# Patient Record
Sex: FEMALE | Race: OTHER | HISPANIC OR LATINO | URBAN - METROPOLITAN AREA
[De-identification: names, ages, dates, MRNs, and addresses within clinical notes are randomized per-mention and may not be internally consistent; named-entity substitution may affect disease eponyms.]

---

## 2022-06-14 ENCOUNTER — TELEPHONE (OUTPATIENT)
Dept: PLASTIC SURGERY | Facility: CLINIC | Age: 22
End: 2022-06-14

## 2022-06-14 NOTE — TELEPHONE ENCOUNTER
Returned call to patient regarding appointment for breast reduction  Patient does not go to [facility] and has no documentation  Will start process and let me know when documentation is complete and being faxed so I can call her to make appt  Also emailed letter with criteria

## 2024-01-04 PROCEDURE — 99283 EMERGENCY DEPT VISIT LOW MDM: CPT

## 2024-01-05 ENCOUNTER — HOSPITAL ENCOUNTER (EMERGENCY)
Facility: HOSPITAL | Age: 24
Discharge: HOME | End: 2024-01-05
Attending: EMERGENCY MEDICINE
Payer: COMMERCIAL

## 2024-01-05 VITALS
BODY MASS INDEX: 23.03 KG/M2 | SYSTOLIC BLOOD PRESSURE: 112 MMHG | WEIGHT: 122 LBS | TEMPERATURE: 97.8 F | HEART RATE: 59 BPM | RESPIRATION RATE: 18 BRPM | OXYGEN SATURATION: 100 % | DIASTOLIC BLOOD PRESSURE: 60 MMHG | HEIGHT: 61 IN

## 2024-01-05 DIAGNOSIS — R68.89 FLU-LIKE SYMPTOMS: Primary | ICD-10-CM

## 2024-01-05 LAB
FLUAV RNA RESP QL NAA+PROBE: NEGATIVE
FLUBV RNA RESP QL NAA+PROBE: NEGATIVE
RSV RNA RESP QL NAA+PROBE: NEGATIVE
SARS-COV-2 RNA RESP QL NAA+PROBE: NEGATIVE

## 2024-01-05 PROCEDURE — 99284 EMERGENCY DEPT VISIT MOD MDM: CPT | Performed by: EMERGENCY MEDICINE

## 2024-01-05 PROCEDURE — 0241U HB NFCT DS VIR RESP RNA 4 TRGT: CPT | Performed by: EMERGENCY MEDICINE

## 2024-01-05 RX ORDER — ONDANSETRON 4 MG/1
4 TABLET, ORALLY DISINTEGRATING ORAL EVERY 8 HOURS PRN
Qty: 20 TABLET | Refills: 0 | Status: SHIPPED | OUTPATIENT
Start: 2024-01-05

## 2024-01-05 NOTE — Clinical Note
Delmi Tejeda was seen and treated in our emergency department on 1/4/2024.                Diagnosis:     Delmi  .    She may return on this date: 01/08/2024         If you have any questions or concerns, please don't hesitate to call.      Brianna Glover MD    ______________________________           _______________          _______________  Hospital Representative                              Date                                Time

## 2024-01-05 NOTE — Clinical Note
Delmi Tejeda was seen and treated in our emergency department on 1/4/2024.                Diagnosis:     Delmi  may return to work on return date.    She may return on this date: 01/05/2024         If you have any questions or concerns, please don't hesitate to call.      Brianna Glover MD    ______________________________           _______________          _______________  Hospital Representative                              Date                                Time

## 2024-01-05 NOTE — DISCHARGE INSTRUCTIONS
Plenty of fluids  Tylenol 650mg every 6 hours as needed for pain, fever (max 3000mg in 24 hours)   Aleve 2 tabs twice daily with food OR ibuprofen 200-800mg every 8 hours with food as needed for pain (if not pregnant)  Zofran as needed for nausea  Return with lightheadedness rash trouble breathing not peeing every 6-8 hours or any new or worsening symptoms

## 2024-01-05 NOTE — ED PROVIDER NOTES
History  Chief Complaint   Patient presents with    Generalized Body Aches     Pt presents with body aches, chills, headache x 1 day     23-year-old otherwise healthy female presents with 1 day history of headache chills and bodyaches.  Her mom who was in the same household is also been sick with flulike symptoms.  She has had slight nausea no vomiting she denies any chest pain shortness of breath or wheezing.  No history of any rashes.  She denies abdominal pain or diarrhea.  No dysuria or increased urinary frequency no lightheadedness.  She was sent home from work today due to symptoms.  Past medical history is unremarkable past surgical history is breast reduction surgery injury and abdominoplasty        None       History reviewed. No pertinent past medical history.    History reviewed. No pertinent surgical history.    History reviewed. No pertinent family history.  I have reviewed and agree with the history as documented.    E-Cigarette/Vaping    E-Cigarette Use Current Some Day User      E-Cigarette/Vaping Substances    Nicotine Yes      Social History     Tobacco Use    Smoking status: Never    Smokeless tobacco: Never   Vaping Use    Vaping status: Some Days    Substances: Nicotine   Substance Use Topics    Alcohol use: Never    Drug use: Never       Review of Systems   Constitutional:  Positive for activity change and appetite change. Negative for chills and fever.   HENT:  Negative for congestion, ear pain, rhinorrhea, sneezing, sore throat, trouble swallowing and voice change.    Eyes:  Negative for discharge.   Respiratory:  Negative for cough and shortness of breath.    Cardiovascular:  Negative for chest pain and leg swelling.   Gastrointestinal:  Positive for nausea. Negative for abdominal pain, blood in stool, diarrhea and vomiting.   Endocrine: Negative for polyuria.   Genitourinary:  Negative for difficulty urinating, dysuria, frequency and urgency.   Musculoskeletal:  Positive for myalgias.  Negative for back pain.   Skin:  Negative for rash.   Neurological:  Positive for headaches. Negative for dizziness, weakness and numbness.   Hematological:  Negative for adenopathy.   Psychiatric/Behavioral:  Negative for confusion.    All other systems reviewed and are negative.      Physical Exam  Physical Exam  Vitals and nursing note reviewed.   Constitutional:       General: She is not in acute distress.     Appearance: She is well-developed. She is not diaphoretic.   HENT:      Head: Normocephalic and atraumatic.      Right Ear: Tympanic membrane and external ear normal.      Left Ear: Tympanic membrane and external ear normal.      Nose: Nose normal.      Mouth/Throat:      Mouth: Mucous membranes are dry.      Pharynx: No oropharyngeal exudate or posterior oropharyngeal erythema.   Eyes:      General:         Right eye: No discharge.         Left eye: No discharge.      Extraocular Movements: Extraocular movements intact.      Conjunctiva/sclera: Conjunctivae normal.      Pupils: Pupils are equal, round, and reactive to light.   Neck:      Comments: No midline or paraspinous tenderness  Cardiovascular:      Rate and Rhythm: Normal rate and regular rhythm.      Pulses: Normal pulses.      Heart sounds: Normal heart sounds.   Pulmonary:      Effort: Pulmonary effort is normal. No respiratory distress.      Breath sounds: Normal breath sounds. No stridor. No wheezing, rhonchi or rales.      Comments: Sats are 100% on room air  Abdominal:      General: Bowel sounds are normal. There is no distension.      Palpations: Abdomen is soft.      Tenderness: There is no abdominal tenderness. There is no right CVA tenderness, left CVA tenderness, guarding or rebound.      Comments: Back no midline or CVA tenderness   Musculoskeletal:         General: No tenderness or deformity. Normal range of motion.      Cervical back: Normal range of motion and neck supple. No rigidity or tenderness.      Right lower leg: No edema.       Left lower leg: No edema.   Lymphadenopathy:      Cervical: No cervical adenopathy.   Skin:     General: Skin is warm and dry.      Capillary Refill: Capillary refill takes less than 2 seconds.   Neurological:      Mental Status: She is alert and oriented to person, place, and time. Mental status is at baseline.      Cranial Nerves: No cranial nerve deficit.      Sensory: No sensory deficit.      Motor: No weakness or abnormal muscle tone.      Coordination: Coordination normal.      Gait: Gait normal.      Comments: Gait steady   Psychiatric:         Mood and Affect: Mood normal.         Vital Signs  ED Triage Vitals [01/05/24 0036]   Temperature Pulse Respirations Blood Pressure SpO2   97.8 °F (36.6 °C) 59 18 112/60 100 %      Temp Source Heart Rate Source Patient Position - Orthostatic VS BP Location FiO2 (%)   Temporal Monitor Sitting Left arm --      Pain Score       10 - Worst Possible Pain           Vitals:    01/05/24 0036   BP: 112/60   Pulse: 59   Patient Position - Orthostatic VS: Sitting         Visual Acuity      ED Medications  Medications - No data to display    Diagnostic Studies  Results Reviewed       Procedure Component Value Units Date/Time    FLU/RSV/COVID - if FLU/RSV clinically relevant [[id number]]  (Normal) Collected: 01/05/24 0042    Lab Status: Final result Specimen: Nares from Nose Updated: 01/05/24 0126     SARS-CoV-2 Negative     INFLUENZA A PCR Negative     INFLUENZA B PCR Negative     RSV PCR Negative    Narrative:      FOR PEDIATRIC PATIENTS - copy/paste COVID Guidelines URL to browser: [email or web address]    SARS-CoV-2 assay is a Nucleic Acid Amplification assay intended for the  qualitative detection of nucleic acid from SARS-CoV-2 in nasopharyngeal  swabs. Results are for the presumptive identification of SARS-CoV-2 RNA.    Positive results are indicative of infection with SARS-CoV-2, the virus  causing COVID-19, but do  not rule out bacterial infection or co-infection  with other viruses. Laboratories within the [place] and its  territories are required to report all positive results to the appropriate  public health authorities. Negative results do not preclude SARS-CoV-2  infection and should not be used as the sole basis for treatment or other  patient management decisions. Negative results must be combined with  clinical observations, patient history, and epidemiological information.  This test has not been FDA cleared or approved.    This test has been authorized by FDA under an Emergency Use Authorization  (EUA). This test is only authorized for the duration of time the  declaration that circumstances exist justifying the authorization of the  emergency use of an in vitro diagnostic tests for detection of SARS-CoV-2  virus and/or diagnosis of COVID-19 infection under section 564(b)(1) of  the Act, 21 U.S.C. 360bbb-3(b)(1), unless the authorization is terminated  or revoked sooner. The test has been validated but independent review by FDA  and CLIA is pending.    Test performed using [employer] GeneXpert: This RT-PCR assay targets N2,  a region unique to SARS-CoV-2. A conserved region in the E-gene was chosen  for pan-Sarbecovirus detection which includes SARS-CoV-2.    According to CMS-2020-01-R, this platform meets the definition of high-throughput technology.                   No orders to display              Procedures  Procedures         ED Course                               SBIRT 22yo+      Flowsheet Row Most Recent Value   Initial Alcohol Screen: US AUDIT-C     1. How often do you have a drink containing alcohol? 0 Filed at: 01/05/2024 0039   2. How many drinks containing alcohol do you have on a typical day you are drinking?  0 Filed at: 01/05/2024 0039   3b. FEMALE Any Age, or MALE 65+: How often do you have 4 or more drinks on one occassion? 0 Filed at: 01/05/2024 0039   Audit-C Score 0 Filed at: 01/05/2024  0039   NOAM: How many times in the past year have you...    Used an illegal drug or used a prescription medication for non-medical reasons? Never Filed at: 01/05/2024 0039                      Medical Decision Making  Patient is not demonstrating any signs or symptoms of increased work of breathing or respiratory distress.  Therefore will defer chest x-ray patient is in agreement.  Ultimately her COVID influenza RSV swab was negative this was reviewed with patient there is the possibility of an early false negative.  Recommend symptomatic management ibuprofen Tylenol appropriate dosages reviewed she can only take the NSAIDs if she is not pregnant will prescribe Zofran to use on an as-needed basis to stay hydrated return precautions were reviewed.    Risk  Prescription drug management.             Disposition  Final diagnoses:   Flu-like symptoms     Time reflects when diagnosis was documented in both MDM as applicable and the Disposition within this note       Time User Action Codes Description Comment    1/5/2024  1:27 AM Brianna Glover [R68.89] Flu-like symptoms           ED Disposition       ED Disposition   Discharge    Condition   Stable    Date/Time   Fri Jan 5, 2024 0127    Comment   Delmi Tejeda discharge to home/self care.                   Follow-up Information       Follow up With Specialties Details Why Contact Info    Yesica Murray  Call in 3 days if not improved [address]  [address] [facility] [address]  [phone]              Discharge Medication List as of 1/5/2024  1:33 AM        START taking these medications    Details   ondansetron (ZOFRAN-ODT) 4 mg disintegrating tablet Take 1 tablet (4 mg total) by mouth every 8 (eight) hours as needed for nausea or vomiting for up to 20 doses, Starting Fri 1/5/2024, Normal             No discharge procedures on file.    PDMP Review       None            ED Provider  Electronically Signed by             Brianna Glover,  MD  01/05/24 0153

## 2024-02-19 ENCOUNTER — HOSPITAL ENCOUNTER (EMERGENCY)
Facility: HOSPITAL | Age: 24
Discharge: HOME | End: 2024-02-19
Attending: EMERGENCY MEDICINE
Payer: COMMERCIAL

## 2024-02-19 VITALS
WEIGHT: 122 LBS | RESPIRATION RATE: 16 BRPM | OXYGEN SATURATION: 99 % | TEMPERATURE: 96.9 F | DIASTOLIC BLOOD PRESSURE: 65 MMHG | BODY MASS INDEX: 23.03 KG/M2 | HEART RATE: 83 BPM | HEIGHT: 61 IN | SYSTOLIC BLOOD PRESSURE: 112 MMHG

## 2024-02-19 DIAGNOSIS — N30.00 ACUTE CYSTITIS: Primary | ICD-10-CM

## 2024-02-19 LAB
BACTERIA UR QL AUTO: NORMAL /HPF
BILIRUB UR QL STRIP: NEGATIVE
CLARITY UR: ABNORMAL
COLOR UR: YELLOW
EXT PREGNANCY TEST URINE: NEGATIVE
EXT. CONTROL: NORMAL
GLUCOSE UR STRIP-MCNC: NEGATIVE MG/DL
HGB UR QL STRIP.AUTO: ABNORMAL
KETONES UR STRIP-MCNC: ABNORMAL MG/DL
LEUKOCYTE ESTERASE UR QL STRIP: NEGATIVE
NITRITE UR QL STRIP: NEGATIVE
NON-SQ EPI CELLS URNS QL MICRO: NORMAL /HPF
PH UR STRIP.AUTO: 8 [PH]
PROT UR STRIP-MCNC: NEGATIVE MG/DL
RBC #/AREA URNS AUTO: NORMAL /HPF
SP GR UR STRIP.AUTO: 1.01 (ref 1–1.03)
UROBILINOGEN UR QL STRIP.AUTO: 1 E.U./DL
WBC #/AREA URNS AUTO: NORMAL /HPF

## 2024-02-19 PROCEDURE — 87491 CHLMYD TRACH DNA AMP PROBE: CPT | Performed by: EMERGENCY MEDICINE

## 2024-02-19 PROCEDURE — 87591 N.GONORRHOEAE DNA AMP PROB: CPT | Performed by: EMERGENCY MEDICINE

## 2024-02-19 PROCEDURE — 81514 NFCT DS BV&VAGINITIS DNA ALG: CPT | Performed by: EMERGENCY MEDICINE

## 2024-02-19 PROCEDURE — 87086 URINE CULTURE/COLONY COUNT: CPT | Performed by: EMERGENCY MEDICINE

## 2024-02-19 PROCEDURE — 99283 EMERGENCY DEPT VISIT LOW MDM: CPT

## 2024-02-19 PROCEDURE — 81025 URINE PREGNANCY TEST: CPT | Performed by: EMERGENCY MEDICINE

## 2024-02-19 PROCEDURE — 81001 URINALYSIS AUTO W/SCOPE: CPT | Performed by: EMERGENCY MEDICINE

## 2024-02-19 PROCEDURE — 99284 EMERGENCY DEPT VISIT MOD MDM: CPT | Performed by: EMERGENCY MEDICINE

## 2024-02-19 RX ORDER — PHENAZOPYRIDINE HYDROCHLORIDE 200 MG/1
200 TABLET, FILM COATED ORAL 3 TIMES DAILY
Qty: 6 TABLET | Refills: 0 | Status: SHIPPED | OUTPATIENT
Start: 2024-02-19

## 2024-02-19 RX ORDER — IBUPROFEN 600 MG/1
600 TABLET ORAL ONCE
Status: COMPLETED | OUTPATIENT
Start: 2024-02-19 | End: 2024-02-19

## 2024-02-19 RX ORDER — IBUPROFEN 600 MG/1
600 TABLET ORAL EVERY 6 HOURS PRN
Qty: 30 TABLET | Refills: 0 | Status: SHIPPED | OUTPATIENT
Start: 2024-02-19

## 2024-02-19 RX ORDER — PHENAZOPYRIDINE HYDROCHLORIDE 100 MG/1
100 TABLET, FILM COATED ORAL ONCE
Status: COMPLETED | OUTPATIENT
Start: 2024-02-19 | End: 2024-02-19

## 2024-02-19 RX ORDER — CEPHALEXIN 250 MG/1
500 CAPSULE ORAL ONCE
Status: COMPLETED | OUTPATIENT
Start: 2024-02-19 | End: 2024-02-19

## 2024-02-19 RX ORDER — CEPHALEXIN 500 MG/1
500 CAPSULE ORAL EVERY 12 HOURS SCHEDULED
Qty: 14 CAPSULE | Refills: 0 | Status: SHIPPED | OUTPATIENT
Start: 2024-02-19 | End: 2024-02-26

## 2024-02-19 RX ADMIN — CEPHALEXIN 500 MG: 250 CAPSULE ORAL at 04:53

## 2024-02-19 RX ADMIN — IBUPROFEN 600 MG: 600 TABLET, FILM COATED ORAL at 04:52

## 2024-02-19 RX ADMIN — PHENAZOPYRIDINE 100 MG: 100 TABLET ORAL at 04:53

## 2024-02-19 NOTE — ED PROVIDER NOTES
History  Chief Complaint   Patient presents with    Possible UTI     Patient reports burning with urination for the past week.      Delmi Tejeda is a 23 y.o. year old female previously healthy presenting to the [facility] ED for dysuria. Patient is reporting 1 week of worsening symptoms.  She has burning with urination.  She has the sudden urge to urinate.  She has had no hematuria though she is currently on her period.  Patient is reporting mild, crampy lower abdominal pain which she attributes to her period.  She had no fevers or chills.  No nausea/vomiting.  No flank pain.  Patient is in a monogamous relationship.  She noticed thick white discharge prior to onset of period which was not malodorous. Patient denies any other complaints. Patient has not taken/received any medications at home for relief of symptoms.      History provided by:  Medical records and patient   used: No        Prior to Admission Medications   Prescriptions Last Dose Informant Patient Reported? Taking?   ondansetron (ZOFRAN-ODT) 4 mg disintegrating tablet   No No   Sig: Take 1 tablet (4 mg total) by mouth every 8 (eight) hours as needed for nausea or vomiting for up to 20 doses      Facility-Administered Medications: None       History reviewed. No pertinent past medical history.    History reviewed. No pertinent surgical history.    History reviewed. No pertinent family history.  I have reviewed and agree with the history as documented.    E-Cigarette/Vaping    E-Cigarette Use Current Some Day User      E-Cigarette/Vaping Substances    Nicotine Yes      Social History     Tobacco Use    Smoking status: Never    Smokeless tobacco: Never   Vaping Use    Vaping status: Some Days    Substances: Nicotine   Substance Use Topics    Alcohol use: Never    Drug use: Never       Review of Systems   Constitutional:  Negative for chills and fever.   Respiratory:  Negative for shortness of breath.    Cardiovascular:  Negative for chest  pain.   Gastrointestinal:  Positive for abdominal pain. Negative for diarrhea, nausea and vomiting.   Genitourinary:  Positive for dysuria, urgency and vaginal bleeding. Negative for flank pain and hematuria.   Musculoskeletal:  Negative for back pain.   All other systems reviewed and are negative.      Physical Exam  Physical Exam  Vitals and nursing note reviewed.   Constitutional:       General: She is not in acute distress.     Appearance: Normal appearance. She is well-developed. She is not ill-appearing, toxic-appearing or diaphoretic.   HENT:      Head: Normocephalic and atraumatic.      Nose: No congestion or rhinorrhea.   Eyes:      General:         Right eye: No discharge.         Left eye: No discharge.   Cardiovascular:      Rate and Rhythm: Normal rate and regular rhythm.   Pulmonary:      Effort: Pulmonary effort is normal. No accessory muscle usage or respiratory distress.      Breath sounds: Normal breath sounds. No stridor. No decreased breath sounds, wheezing, rhonchi or rales.   Abdominal:      General: There is no distension.      Palpations: Abdomen is soft.      Tenderness: There is abdominal tenderness in the right lower quadrant, suprapubic area and left lower quadrant. There is no right CVA tenderness, left CVA tenderness, guarding or rebound.   Musculoskeletal:      Cervical back: Normal range of motion. No rigidity.      Right lower leg: No tenderness.      Left lower leg: No tenderness.   Skin:     Capillary Refill: Capillary refill takes less than 2 seconds.      Findings: No rash.   Neurological:      Mental Status: She is alert and oriented to person, place, and time.   Psychiatric:         Mood and Affect: Mood normal.         Behavior: Behavior normal.         Vital Signs  ED Triage Vitals [02/19/24 0407]   Temperature Pulse Respirations Blood Pressure SpO2   (!) 96.9 °F (36.1 °C) 83 16 112/65 99 %      Temp Source Heart Rate Source Patient Position - Orthostatic VS BP Location FiO2  (%)   Temporal Monitor Sitting Left arm --      Pain Score       2           Vitals:    02/19/24 0407   BP: 112/65   Pulse: 83   Patient Position - Orthostatic VS: Sitting         Visual Acuity      ED Medications  Medications   ibuprofen (MOTRIN) tablet 600 mg (600 mg Oral Given 2/19/24 0452)   cephalexin (KEFLEX) capsule 500 mg (500 mg Oral Given 2/19/24 0453)   phenazopyridine (PYRIDIUM) tablet 100 mg (100 mg Oral Given 2/19/24 0453)       Diagnostic Studies  Results Reviewed       Procedure Component Value Units Date/Time    Molecular Vaginal Panel [[id number]] Collected: 02/19/24 0455    Lab Status: In process Specimen: Genital from Vaginal Updated: 02/19/24 0503    Chlamydia/GC amplified DNA by PCR [[id number]] Collected: 02/19/24 0413    Lab Status: In process Updated: 02/19/24 0453    Urine culture [[id number]] Collected: 02/19/24 0413    Lab Status: In process Specimen: Urine, Clean Catch Updated: 02/19/24 0453    Urine Microscopic [[id number]]  (Normal) Collected: 02/19/24 0413    Lab Status: Final result Specimen: Urine, Clean Catch Updated: 02/19/24 0428     RBC, UA 1-2 /hpf      WBC, UA 1-2 /hpf      Epithelial Cells Occasional /hpf      Bacteria, UA None Seen /hpf     UA w Reflex to Microscopic w Reflex to Culture [[id number]]  (Abnormal) Collected: 02/19/24 0413    Lab Status: Final result Specimen: Urine, Clean Catch Updated: 02/19/24 0421     Color, UA Yellow     Clarity, UA Slightly Cloudy     Specific Gravity, UA 1.015     pH, UA 8.0     Leukocytes, UA Negative     Nitrite, UA Negative     Protein, UA Negative mg/dl      Glucose, UA Negative mg/dl      Ketones, UA Trace mg/dl      Urobilinogen, UA 1.0 E.U./dl      Bilirubin, UA Negative     Occult Blood, UA Large    POCT pregnancy, urine [[id number]]  (Normal) Resulted: 02/19/24 0418    Lab Status: Final result Updated: 02/19/24 0418     EXT Preg Test, Ur Negative     Control Valid                   No orders to display               Procedures  Procedures         ED Course                               SBIRT 22yo+      Flowsheet Row Most Recent Value   Initial Alcohol Screen: US AUDIT-C     1. How often do you have a drink containing alcohol? 0 Filed at: 02/19/2024 0406   2. How many drinks containing alcohol do you have on a typical day you are drinking?  0 Filed at: 02/19/2024 0406   3a. Male UNDER 65: How often do you have five or more drinks on one occasion? 0 Filed at: 02/19/2024 0406   3b. FEMALE Any Age, or MALE 65+: How often do you have 4 or more drinks on one occassion? 0 Filed at: 02/19/2024 0406   Audit-C Score 0 Filed at: 02/19/2024 0406   NOAM: How many times in the past year have you...    Used an illegal drug or used a prescription medication for non-medical reasons? Never Filed at: 02/19/2024 0406                      Medical Decision Making    23 y.o. female presenting for burning with urination.  VSS, well-appearing.  Will order Upreg and UA to evaluate for UTI.  No fever, vomiting or flank pain to suggest appendicitis or pyelonephritis.  Will treat symptomatically.    UA does not appear consistent with cystitis.  Given symptoms will treat with course of cephalexin while awaiting urine culture results.  Given reported thick vaginal discharge will order molecular vaginal panel to screen for BV.  Will also screen for GC.    I have discussed with the patient our plan to discharge them from the ED and the patient is in agreement with this plan. The patient was provided a written after visit summary with strict RTED precautions.     Discharge Plan: Prescription for cephalexin, Pyridium, Motrin.  Patient aware she is being discharged with pending molecular vaginal panel and G/C.    Followup: I have discussed with the patient plan to follow up with a PCP. Contact information provided in AVS.    Amount and/or Complexity of Data Reviewed  Labs: ordered.    Risk  Prescription drug management.             Disposition  Final  diagnoses:   Acute cystitis     Time reflects when diagnosis was documented in both MDM as applicable and the Disposition within this note       Time User Action Codes Description Comment    2/19/2024  4:36 AM Eloy Horvath [N30.00] Acute cystitis           ED Disposition       ED Disposition   Discharge    Condition   Stable    Date/Time   Mon Feb 19, 2024 0438    Comment   Delmi Tejeda discharge to home/self care.                   Follow-up Information       Follow up With Specialties Details Why Contact Info Additional Information    [facility] Schedule an appointment as soon as possible for a visit  To make appointment for reevaluation in 3-5 days and to establish care. [address] [place] [id number]  [phone] [facility], [address] [place] [address], [id number], [phone]            Discharge Medication List as of 2/19/2024  4:43 AM        START taking these medications    Details   cephalexin (KEFLEX) 500 mg capsule Take 1 capsule (500 mg total) by mouth every 12 (twelve) hours for 7 days, Starting Mon 2/19/2024, Until Mon 2/26/2024, Normal      ibuprofen (MOTRIN) 600 mg tablet Take 1 tablet (600 mg total) by mouth every 6 (six) hours as needed for moderate pain, Starting Mon 2/19/2024, Normal      phenazopyridine (PYRIDIUM) 200 mg tablet Take 1 tablet (200 mg total) by mouth 3 (three) times a day, Starting Mon 2/19/2024, Normal           CONTINUE these medications which have NOT CHANGED    Details   ondansetron (ZOFRAN-ODT) 4 mg disintegrating tablet Take 1 tablet (4 mg total) by mouth every 8 (eight) hours as needed for nausea or vomiting for up to 20 doses, Starting Fri 1/5/2024, Normal             No discharge procedures on file.    PDMP Review       None            ED Provider  Electronically Signed by             Eloy Horvath DO  02/19/24 0606

## 2024-02-19 NOTE — DISCHARGE INSTRUCTIONS
You have been seen for discomfort with urination. Please complete the course of cephalexin as prescribed. Take motrin and pyridium for your symptoms. Return to the emergency department if you develop worsening discomfort, abdominal pain, vomiting, fevers or any other symptoms of concern. Please follow up with a PCP by calling the number provided.

## 2024-02-19 NOTE — Clinical Note
Delmi Tejeda was seen and treated in our emergency department on 2/19/2024.                Diagnosis: dysuria    Delmi  is off the rest of the shift today.    She may return on this date:          If you have any questions or concerns, please don't hesitate to call.      Moira S Page, RN    ______________________________           _______________          _______________  Hospital Representative                              Date                                Time

## 2024-02-20 LAB
BACTERIA UR CULT: NORMAL
C GLABRATA DNA VAG QL NAA+PROBE: NEGATIVE
C KRUSEI DNA VAG QL NAA+PROBE: NEGATIVE
C TRACH DNA SPEC QL NAA+PROBE: POSITIVE
CANDIDA SP 6 PNL VAG NAA+PROBE: NEGATIVE
N GONORRHOEA DNA SPEC QL NAA+PROBE: NEGATIVE
T VAGINALIS DNA VAG QL NAA+PROBE: NEGATIVE
VAGINOSIS/ITIS DNA PNL VAG PROBE+SIG AMP: NEGATIVE

## 2024-02-21 DIAGNOSIS — A74.9 CHLAMYDIA INFECTION: Primary | ICD-10-CM

## 2024-02-21 RX ORDER — DOXYCYCLINE HYCLATE 100 MG/1
100 CAPSULE ORAL 2 TIMES DAILY
Qty: 14 CAPSULE | Refills: 0 | Status: SHIPPED | OUTPATIENT
Start: 2024-02-21 | End: 2024-02-28

## 2024-03-21 ENCOUNTER — PATIENT MESSAGE (OUTPATIENT)
Age: 24
End: 2024-03-21

## 2024-03-21 ENCOUNTER — HOSPITAL ENCOUNTER (EMERGENCY)
Facility: HOSPITAL | Age: 24
Discharge: HOME | End: 2024-03-21
Attending: EMERGENCY MEDICINE
Payer: COMMERCIAL

## 2024-03-21 ENCOUNTER — APPOINTMENT (EMERGENCY)
Dept: ULTRASOUND IMAGING | Facility: HOSPITAL | Age: 24
End: 2024-03-21
Payer: COMMERCIAL

## 2024-03-21 ENCOUNTER — TELEPHONE (OUTPATIENT)
Age: 24
End: 2024-03-21

## 2024-03-21 VITALS
RESPIRATION RATE: 18 BRPM | DIASTOLIC BLOOD PRESSURE: 65 MMHG | BODY MASS INDEX: 23.03 KG/M2 | WEIGHT: 122 LBS | OXYGEN SATURATION: 100 % | TEMPERATURE: 97.2 F | HEIGHT: 61 IN | HEART RATE: 67 BPM | SYSTOLIC BLOOD PRESSURE: 113 MMHG

## 2024-03-21 DIAGNOSIS — R93.5 ABNORMAL ULTRASOUND OF UTERUS: ICD-10-CM

## 2024-03-21 DIAGNOSIS — Z32.01 POSITIVE PREGNANCY TEST: ICD-10-CM

## 2024-03-21 DIAGNOSIS — R10.9 ABDOMINAL CRAMPING: Primary | ICD-10-CM

## 2024-03-21 LAB
ABO GROUP BLD: NORMAL
ALBUMIN SERPL BCP-MCNC: 4.6 G/DL (ref 3.5–5)
ALP SERPL-CCNC: 50 U/L (ref 34–104)
ALT SERPL W P-5'-P-CCNC: 14 U/L (ref 7–52)
ANION GAP SERPL CALCULATED.3IONS-SCNC: 8 MMOL/L (ref 4–13)
AST SERPL W P-5'-P-CCNC: 17 U/L (ref 13–39)
B-HCG SERPL-ACNC: ABNORMAL MIU/ML (ref 0–5)
BASOPHILS # BLD AUTO: 0.07 THOUSANDS/ÂΜL (ref 0–0.1)
BASOPHILS NFR BLD AUTO: 1 % (ref 0–1)
BILIRUB SERPL-MCNC: 0.43 MG/DL (ref 0.2–1)
BILIRUB UR QL STRIP: NEGATIVE
BUN SERPL-MCNC: 8 MG/DL (ref 5–25)
CALCIUM SERPL-MCNC: 10 MG/DL (ref 8.4–10.2)
CHLORIDE SERPL-SCNC: 103 MMOL/L (ref 96–108)
CLARITY UR: CLEAR
CO2 SERPL-SCNC: 25 MMOL/L (ref 21–32)
COLOR UR: YELLOW
CREAT SERPL-MCNC: 0.71 MG/DL (ref 0.6–1.3)
EOSINOPHIL # BLD AUTO: 0.2 THOUSAND/ÂΜL (ref 0–0.61)
EOSINOPHIL NFR BLD AUTO: 4 % (ref 0–6)
ERYTHROCYTE [DISTWIDTH] IN BLOOD BY AUTOMATED COUNT: 12.8 % (ref 11.6–15.1)
EXT PREGNANCY TEST URINE: POSITIVE
EXT. CONTROL: ABNORMAL
GFR SERPL CREATININE-BSD FRML MDRD: 118 ML/MIN/1.73SQ M
GLUCOSE SERPL-MCNC: 69 MG/DL (ref 65–140)
GLUCOSE UR STRIP-MCNC: NEGATIVE MG/DL
HCT VFR BLD AUTO: 41 % (ref 34.8–46.1)
HGB BLD-MCNC: 13.2 G/DL (ref 11.5–15.4)
HGB UR QL STRIP.AUTO: NEGATIVE
IMM GRANULOCYTES # BLD AUTO: 0.01 THOUSAND/UL (ref 0–0.2)
IMM GRANULOCYTES NFR BLD AUTO: 0 % (ref 0–2)
KETONES UR STRIP-MCNC: NEGATIVE MG/DL
LEUKOCYTE ESTERASE UR QL STRIP: NEGATIVE
LIPASE SERPL-CCNC: 40 U/L (ref 11–82)
LYMPHOCYTES # BLD AUTO: 1.42 THOUSANDS/ÂΜL (ref 0.6–4.47)
LYMPHOCYTES NFR BLD AUTO: 28 % (ref 14–44)
MCH RBC QN AUTO: 28.9 PG (ref 26.8–34.3)
MCHC RBC AUTO-ENTMCNC: 32.2 G/DL (ref 31.4–37.4)
MCV RBC AUTO: 90 FL (ref 82–98)
MONOCYTES # BLD AUTO: 0.34 THOUSAND/ÂΜL (ref 0.17–1.22)
MONOCYTES NFR BLD AUTO: 7 % (ref 4–12)
NEUTROPHILS # BLD AUTO: 3 THOUSANDS/ÂΜL (ref 1.85–7.62)
NEUTS SEG NFR BLD AUTO: 60 % (ref 43–75)
NITRITE UR QL STRIP: NEGATIVE
NRBC BLD AUTO-RTO: 0 /100 WBCS
PH UR STRIP.AUTO: 6.5 [PH]
PLATELET # BLD AUTO: 263 THOUSANDS/UL (ref 149–390)
PMV BLD AUTO: 11.4 FL (ref 8.9–12.7)
POTASSIUM SERPL-SCNC: 3.2 MMOL/L (ref 3.5–5.3)
PROT SERPL-MCNC: 7.6 G/DL (ref 6.4–8.4)
PROT UR STRIP-MCNC: NEGATIVE MG/DL
RBC # BLD AUTO: 4.56 MILLION/UL (ref 3.81–5.12)
RH BLD: POSITIVE
SODIUM SERPL-SCNC: 136 MMOL/L (ref 135–147)
SP GR UR STRIP.AUTO: 1.01 (ref 1–1.03)
UROBILINOGEN UR QL STRIP.AUTO: 0.2 E.U./DL
WBC # BLD AUTO: 5.04 THOUSAND/UL (ref 4.31–10.16)

## 2024-03-21 PROCEDURE — 76815 OB US LIMITED FETUS(S): CPT

## 2024-03-21 PROCEDURE — 96375 TX/PRO/DX INJ NEW DRUG ADDON: CPT

## 2024-03-21 PROCEDURE — 81003 URINALYSIS AUTO W/O SCOPE: CPT | Performed by: EMERGENCY MEDICINE

## 2024-03-21 PROCEDURE — 81025 URINE PREGNANCY TEST: CPT | Performed by: EMERGENCY MEDICINE

## 2024-03-21 PROCEDURE — 93005 ELECTROCARDIOGRAM TRACING: CPT

## 2024-03-21 PROCEDURE — 96366 THER/PROPH/DIAG IV INF ADDON: CPT

## 2024-03-21 PROCEDURE — 86900 BLOOD TYPING SEROLOGIC ABO: CPT | Performed by: EMERGENCY MEDICINE

## 2024-03-21 PROCEDURE — 83690 ASSAY OF LIPASE: CPT | Performed by: EMERGENCY MEDICINE

## 2024-03-21 PROCEDURE — 84702 CHORIONIC GONADOTROPIN TEST: CPT | Performed by: EMERGENCY MEDICINE

## 2024-03-21 PROCEDURE — 86901 BLOOD TYPING SEROLOGIC RH(D): CPT | Performed by: EMERGENCY MEDICINE

## 2024-03-21 PROCEDURE — 99284 EMERGENCY DEPT VISIT MOD MDM: CPT

## 2024-03-21 PROCEDURE — 36415 COLL VENOUS BLD VENIPUNCTURE: CPT | Performed by: EMERGENCY MEDICINE

## 2024-03-21 PROCEDURE — 80053 COMPREHEN METABOLIC PANEL: CPT | Performed by: EMERGENCY MEDICINE

## 2024-03-21 PROCEDURE — 85025 COMPLETE CBC W/AUTO DIFF WBC: CPT | Performed by: EMERGENCY MEDICINE

## 2024-03-21 PROCEDURE — 87086 URINE CULTURE/COLONY COUNT: CPT | Performed by: EMERGENCY MEDICINE

## 2024-03-21 PROCEDURE — 96365 THER/PROPH/DIAG IV INF INIT: CPT

## 2024-03-21 RX ORDER — ONDANSETRON 2 MG/ML
4 INJECTION INTRAMUSCULAR; INTRAVENOUS ONCE
Status: COMPLETED | OUTPATIENT
Start: 2024-03-21 | End: 2024-03-21

## 2024-03-21 RX ADMIN — ONDANSETRON 4 MG: 2 INJECTION INTRAMUSCULAR; INTRAVENOUS at 03:30

## 2024-03-21 RX ADMIN — MORPHINE SULFATE 2 MG: 2 INJECTION, SOLUTION INTRAMUSCULAR; INTRAVENOUS at 03:30

## 2024-03-21 RX ADMIN — SODIUM CHLORIDE, SODIUM LACTATE, POTASSIUM CHLORIDE, AND CALCIUM CHLORIDE 1000 ML: .6; .31; .03; .02 INJECTION, SOLUTION INTRAVENOUS at 03:27

## 2024-03-21 NOTE — DISCHARGE INSTRUCTIONS
You are pregnant your pregnancy fabienne is [id number] must be recheck on FRI  Ultrasound could not find a pregnacy just a yolk sac you could have a very early pregnancy you could have a blighted ovum(abnormal pregancy that will miscarry) or you could have an ectopic pregnancy (tubal pregnacy - dangerous to you health)  Need to establish with ob gyn in 1 week  Need follow up ultrasound in 7-10 days  Tylenol 650mg every 6 hours as needed for pain, fever (max 3000mg in 24 hours)   No ibuprofen naprosyn while pregnant  Prenatal vitamins  Return with fever worsening or change in abdominal pain, vaginal bleeding, lightheadedness or any new or worsening symptoms  No drinking, driving or heavy machinery use for 6 hours after discharge.

## 2024-03-21 NOTE — ED PROVIDER NOTES
History  Chief Complaint   Patient presents with    Abdominal Pain     Patient reports abdominal cramping for the past week. States that she took a pregnancy test yesterday that was positive     22 yo -0-1-1 last menstrual period 2024 placing her at 5 weeks 1 day otherwise healthy female presents with intermittent severe abdominal cramping.  She states it has been ongoing a week but it was worse today it feels like menstrual cramps but worse.  She always has a constant low-grade pain which will get worse in the bilateral lower quadrants she can also feel it in her back.  She has had she found out she was pregnant on a home test yesterday.  She had no vaginal bleeding or discharge she is occasionally lightheaded she has no urinary complaints of dysuria or increased urinary frequency nausea but no vomiting no trauma or falls no fever chills cough or upper respiratory complaints no one else has been ill  Past surgical history is breast implants and abdominoplasty.        Prior to Admission Medications   Prescriptions Last Dose Informant Patient Reported? Taking?   ibuprofen (MOTRIN) 600 mg tablet   No No   Sig: Take 1 tablet (600 mg total) by mouth every 6 (six) hours as needed for moderate pain   ondansetron (ZOFRAN-ODT) 4 mg disintegrating tablet   No No   Sig: Take 1 tablet (4 mg total) by mouth every 8 (eight) hours as needed for nausea or vomiting for up to 20 doses   phenazopyridine (PYRIDIUM) 200 mg tablet   No No   Sig: Take 1 tablet (200 mg total) by mouth 3 (three) times a day      Facility-Administered Medications: None       History reviewed. No pertinent past medical history.    History reviewed. No pertinent surgical history.    History reviewed. No pertinent family history.  I have reviewed and agree with the history as documented.    E-Cigarette/Vaping    E-Cigarette Use Current Some Day User      E-Cigarette/Vaping Substances    Nicotine Yes      Social History     Tobacco Use    Smoking  status: Never    Smokeless tobacco: Never   Vaping Use    Vaping status: Some Days    Substances: Nicotine   Substance Use Topics    Alcohol use: Never    Drug use: Yes     Types: Marijuana       Review of Systems   Constitutional:  Positive for activity change and appetite change. Negative for chills and fever.   HENT:  Negative for congestion, ear pain, rhinorrhea, sneezing, sore throat, trouble swallowing and voice change.    Eyes:  Negative for discharge.   Respiratory:  Negative for cough and shortness of breath.    Cardiovascular:  Negative for chest pain and leg swelling.   Gastrointestinal:  Positive for abdominal pain and nausea. Negative for blood in stool, diarrhea and vomiting.   Endocrine: Negative for polyuria.   Genitourinary:  Positive for pelvic pain. Negative for difficulty urinating, dyspareunia, dysuria, frequency, urgency, vaginal bleeding, vaginal discharge and vaginal pain.   Musculoskeletal:  Positive for back pain. Negative for myalgias.   Skin:  Negative for rash.   Neurological:  Positive for light-headedness. Negative for dizziness, weakness, numbness and headaches.   Hematological:  Negative for adenopathy.   Psychiatric/Behavioral:  Negative for confusion.    All other systems reviewed and are negative.      Physical Exam  Physical Exam  Vitals and nursing note reviewed.   Constitutional:       General: She is not in acute distress.     Appearance: She is well-developed. She is not diaphoretic.   HENT:      Head: Normocephalic and atraumatic.      Right Ear: Tympanic membrane and external ear normal.      Left Ear: Tympanic membrane and external ear normal.      Nose: Nose normal.      Mouth/Throat:      Pharynx: No oropharyngeal exudate.   Eyes:      General:         Right eye: No discharge.         Left eye: No discharge.      Extraocular Movements: Extraocular movements intact.      Conjunctiva/sclera: Conjunctivae normal.      Pupils: Pupils are equal, round, and reactive to light.    Neck:      Comments: No midline or paraspinous tenderness  Cardiovascular:      Rate and Rhythm: Normal rate and regular rhythm.      Pulses: Normal pulses.      Heart sounds: Normal heart sounds.   Pulmonary:      Effort: Pulmonary effort is normal. No respiratory distress.      Breath sounds: Normal breath sounds.   Abdominal:      General: Bowel sounds are normal. There is no distension.      Palpations: Abdomen is soft.      Tenderness: There is no abdominal tenderness. There is no right CVA tenderness, left CVA tenderness, guarding or rebound.      Comments: Soft no reproducible tenderness Back no midline or CVA tenderness   Musculoskeletal:         General: No tenderness or deformity. Normal range of motion.      Cervical back: Normal range of motion and neck supple.      Right lower leg: No edema.      Left lower leg: No edema.   Skin:     General: Skin is warm and dry.      Capillary Refill: Capillary refill takes less than 2 seconds.      Findings: No rash.   Neurological:      General: No focal deficit present.      Mental Status: She is alert and oriented to person, place, and time.      Cranial Nerves: No cranial nerve deficit.      Sensory: No sensory deficit.      Motor: No weakness or abnormal muscle tone.      Coordination: Coordination normal.      Gait: Gait normal.      Comments: Gait steady   Psychiatric:         Mood and Affect: Mood normal.         Vital Signs  ED Triage Vitals [03/21/24 0122]   Temperature Pulse Respirations Blood Pressure SpO2   (!) 97.2 °F (36.2 °C) 89 16 122/62 99 %      Temp Source Heart Rate Source Patient Position - Orthostatic VS BP Location FiO2 (%)   Temporal Monitor Sitting Right arm --      Pain Score       7           Vitals:    03/21/24 0315 03/21/24 0415 03/21/24 0639 03/21/24 0647   BP: 109/55 113/65 113/65 113/65   Pulse: 73 67 67 67   Patient Position - Orthostatic VS: Lying Lying  Lying         Visual Acuity      ED Medications  Medications   lactated  ringers bolus 1,000 mL (0 mL Intravenous Stopped 3/21/24 0639)   ondansetron (ZOFRAN) injection 4 mg (4 mg Intravenous Given 3/21/24 0330)   morphine injection 2 mg (2 mg Intravenous Given 3/21/24 0330)       Diagnostic Studies  Results Reviewed       Procedure Component Value Units Date/Time    hCG, quantitative [[id number]]  (Abnormal) Collected: 03/21/24 0215    Lab Status: Final result Specimen: Blood Updated: 03/21/24 0411     HCG, Quant 15,976 mIU/mL     Narrative:       Expected Ranges:    HCG results between 5 and 25 mIU/mL may be indicative of early pregnancy but should be interpreted in light of the total clinical presentation.    HCG can rise to detectable levels in koffi and post menopausal women (0-11.6 mIU/mL).     Approximate               Approximate HCG  Gestation age          Concentration ( mIU/mL)  _____________          ______________________   Weeks                      HCG values  0.2-1                       5-50  1-2                           2-3                         100-5000  3-4                         500-77842  4-5                         1000-43677  5-6                         [id number]  6-8                         57450-128728  8-12                        03373-506275      Comprehensive metabolic panel [[id number]]  (Abnormal) Collected: 03/21/24 0215    Lab Status: Final result Specimen: Blood from Arm, Left Updated: 03/21/24 0250     Sodium 136 mmol/L      Potassium 3.2 mmol/L      Chloride 103 mmol/L      CO2 25 mmol/L      ANION GAP 8 mmol/L      BUN 8 mg/dL      Creatinine 0.71 mg/dL      Glucose 69 mg/dL      Calcium 10.0 mg/dL      AST 17 U/L      ALT 14 U/L      Alkaline Phosphatase 50 U/L      Total Protein 7.6 g/dL      Albumin 4.6 g/dL      Total Bilirubin 0.43 mg/dL      eGFR 118 ml/min/1.73sq m     Narrative:      National Kid[employer] Disease Foundation guidelines for Chronic Kidney Disease (CKD):     Stage 1 with normal or high GFR (GFR > 90 mL/min/1.73 square  meters)    Stage 2 Mild CKD (GFR = 60-89 mL/min/1.73 square meters)    Stage 3A Moderate CKD (GFR = 45-59 mL/min/1.73 square meters)    Stage 3B Moderate CKD (GFR = 30-44 mL/min/1.73 square meters)    Stage 4 Severe CKD (GFR = 15-29 mL/min/1.73 square meters)    Stage 5 End Stage CKD (GFR <15 mL/min/1.73 square meters)  Note: GFR calculation is accurate only with a steady state creatinine    Lipase [[id number]]  (Normal) Collected: 03/21/24 0215    Lab Status: Final result Specimen: Blood from Arm, Left Updated: 03/21/24 0250     Lipase 40 u/L     UA w Reflex to Microscopic w Reflex to Culture [[id number]] Collected: 03/21/24 0132    Lab Status: Final result Specimen: Urine, Clean Catch Updated: 03/21/24 0245     Color, UA Yellow     Clarity, UA Clear     Specific Gravity, UA 1.015     pH, UA 6.5     Leukocytes, UA Negative     Nitrite, UA Negative     Protein, UA Negative mg/dl      Glucose, UA Negative mg/dl      Ketones, UA Negative mg/dl      Urobilinogen, UA 0.2 E.U./dl      Bilirubin, UA Negative     Occult Blood, UA Negative     URINE COMMENT --    Urine culture [[id number]] Collected: 03/21/24 0132    Lab Status: In process Specimen: Urine, Clean Catch Updated: 03/21/24 0245    CBC and differential [[id number]] Collected: 03/21/24 0215    Lab Status: Final result Specimen: Blood from Arm, Left Updated: 03/21/24 0232     WBC 5.04 Thousand/uL      RBC 4.56 Million/uL      Hemoglobin 13.2 g/dL      Hematocrit 41.0 %      MCV 90 fL      MCH 28.9 pg      MCHC 32.2 g/dL      RDW 12.8 %      MPV 11.4 fL      Platelets 263 Thousands/uL      nRBC 0 /100 WBCs      Neutrophils Relative 60 %      Immature Grans % 0 %      Lymphocytes Relative 28 %      Monocytes Relative 7 %      Eosinophils Relative 4 %      Basophils Relative 1 %      Neutrophils Absolute 3.00 Thousands/µL      Absolute Immature Grans 0.01 Thousand/uL      Absolute Lymphocytes 1.42 Thousands/µL      Absolute Monocytes 0.34 Thousand/µL      Eosinophils  Absolute 0.20 Thousand/µL      Basophils Absolute 0.07 Thousands/µL     POCT pregnancy, urine [[id number]]  (Abnormal) Resulted: 03/21/24 0135    Lab Status: Final result Updated: 03/21/24 0135     EXT Preg Test, Ur Positive     Control Valid                   US OB pregnancy limited with transvaginal   Final Result by Nicole Tenorio MD (03/21 0509)      There is an 8 mm intrauterine gestational sac within the fundal portion of the endometrial canal; this is too small for accurate dating. A yolk sac is present within the intrauterine gestational sac. There is no definite evidence of discrete fetal pole    at the present time. Although this is most likely related to early intrauterine gestation too small to visualize, blighted ovum and ectopic pregnancy cannot be entirely excluded. Close clinical correlation and correlation with serial quantitative beta    hCG results is necessary. OB/GYN consultation and follow-up is recommended. Short-term follow-up ultrasound is also recommended.      There is an approximately 19 x 7 x 3 mm subchorionic fluid collection.      There is a small amount of simple appearing free fluid in the pelvis.      This examination demonstrates findings for which clinical and imaging follow-up is recommended and was logged as such in EPIC.      The study was marked in EPIC for immediate notification.      Workstation performed: [id number]                    Procedures  Procedures         ED Course  ED Course as of 03/21/24 0957   Thu Mar 21, 2024   0610 TC to Dr. Cherri womackdarryn ob/gyn attending recommends quantitative B-HCG in 2 days repeat u/s 7-10 days and f/u OB/GYN within a week                               SBIRT 20yo+      Flowsheet Row Most Recent Value   Initial Alcohol Screen: US AUDIT-C     1. How often do you have a drink containing alcohol? 0 Filed at: 03/21/2024 0122   2. How many drinks containing alcohol do you have on a typical day you are drinking?  0 Filed at:  03/21/2024 0122   3a. Male UNDER 65: How often do you have five or more drinks on one occasion? 0 Filed at: 03/21/2024 0122   3b. FEMALE Any Age, or MALE 65+: How often do you have 4 or more drinks on one occassion? 0 Filed at: 03/21/2024 0122   Audit-C Score 0 Filed at: 03/21/2024 0122   NOAM: How many times in the past year have you...    Used an illegal drug or used a prescription medication for non-medical reasons? Never Filed at: 03/21/2024 0122                      Medical Decision Making  Mdm: 23-year-old female who is 5 weeks 1 day pregnant based on dates.  With bilateral lower quadrant cramping radiating to her back.  Will undergo OB ultrasound to assess for ectopic orany free fluid.  Initiate symptomatic management with IV fluid hydration antiemetics and pain medication.  Will check for UTI anemia leukocytosis transaminitis and pancreatitis    Extensively reviewed discharge instructions including possibility of an ectopic pregnancy which can be a life-threatening condition.  Stressed the importance of follow-up as recommended by OB/Gyn on call   Should she experience any worsening pain worsening lightheadedness feeling as if she is going to pass out she needs to return to the emergency department immediately.  Recommended prenatal vitamins which are available over-the-counter and that she establish with OB and ambulatory referral was placed.    Amount and/or Complexity of Data Reviewed  Labs: ordered.  Radiology: ordered.    Risk  Prescription drug management.             Disposition  Final diagnoses:   Abdominal cramping   Positive pregnancy test   Abnormal ultrasound of uterus - There is an 8 mm intrauterine gestational sac within the fundal portion of the endometrial canal; this is too small for accurate dating. A yolk sac is present within the intrauterine gestational sac. There is no definite evidence of discrete fetal pole , at the present time. Although this is most likely related to early  intrauterine gestation too small to visualize, blighted ovum and ectopic pregnancy cannot be entirely excluded     Time reflects when diagnosis was documented in both MDM as applicable and the Disposition within this note       Time User Action Codes Description Comment    3/21/2024  6:22 AM MarcellotatyanaBrianna celeste HORTENCIA Add [R10.9] Abdominal cramping     3/21/2024  6:23 AM AdalbertoBrianna HORTENCIA Add [Z32.01] Positive pregnancy test     3/21/2024  6:23 AM Brianna Glover HORTENCIA Add [R93.5] Abnormal ultrasound of uterus     3/21/2024  6:24 AM MarcellotatyanaBrianna celeste HORTENCIA Modify [R93.5] Abnormal ultrasound of uterus There is an 8 mm intrauterine gestational sac within the fundal portion of the endometrial canal; this is too small for accurate dating. A yolk sac is present within the intrauterine gestational sac. There is no definite evidence of discrete fetal pole , at the present time. Although this is most likely related to early intrauterine gestation too small to visualize, blighted ovum and ectopic pregnancy cannot be entirely excluded          ED Disposition       ED Disposition   Discharge    Condition   Stable    Date/Time   u Mar 21, 2024 0622    Comment   Delmi Tejeda discharge to home/self care.                   Follow-up Information       Follow up With Specialties Details Why Contact Info Additional Information    [facility] Obstetrics and Gynecology Call in 1 day establish care with ob/gyn [address] [place] [id number] [phone] [facility], [address] [place] [address], [id number] [phone]    [facility] Emergency Department Emergency Medicine  If symptoms worsen [address] [id number] [phone] [facility] Emergency Department, [address], [place], [address]            Discharge Medication List as of 3/21/2024  6:38 AM        CONTINUE these  medications which have NOT CHANGED    Details   ibuprofen (MOTRIN) 600 mg tablet Take 1 tablet (600 mg total) by mouth every 6 (six) hours as needed for moderate pain, Starting Mon 2/19/2024, Normal      ondansetron (ZOFRAN-ODT) 4 mg disintegrating tablet Take 1 tablet (4 mg total) by mouth every 8 (eight) hours as needed for nausea or vomiting for up to 20 doses, Starting Fri 1/5/2024, Normal      phenazopyridine (PYRIDIUM) 200 mg tablet Take 1 tablet (200 mg total) by mouth 3 (three) times a day, Starting Mon 2/19/2024, Normal             Outpatient Discharge Orders   hCG, quantitative   Standing Status: Future Standing Exp. Date: 03/21/25       PDMP Review       None            ED Provider  Electronically Signed by             Brianna Glover MD  03/21/24 0957

## 2024-03-21 NOTE — TELEPHONE ENCOUNTER
This is a new patient and we received asap referral for us and hcg testiing.  Please call patient back at [phone]  and she wants to go to [place].  Thank you

## 2024-03-21 NOTE — ED NOTES
While inserting IV, became dizzy. Vs checked and hypoitensive . Placed on monitor and EKG done.      Catherine Garcia RN  03/21/24 0217

## 2024-03-22 LAB
ATRIAL RATE: 60 BPM
BACTERIA UR CULT: NORMAL
P AXIS: 71 DEGREES
PR INTERVAL: 166 MS
QRS AXIS: 80 DEGREES
QRSD INTERVAL: 80 MS
QT INTERVAL: 386 MS
QTC INTERVAL: 386 MS
T WAVE AXIS: 68 DEGREES
VENTRICULAR RATE: 60 BPM

## 2024-03-22 PROCEDURE — 93010 ELECTROCARDIOGRAM REPORT: CPT | Performed by: INTERNAL MEDICINE

## 2024-03-22 NOTE — TELEPHONE ENCOUNTER
Left VM for patient to call us back and schedule [facility] 4/7 in [place] with Dr. Kramer.

## 2024-05-29 ENCOUNTER — HOSPITAL ENCOUNTER (EMERGENCY)
Facility: HOSPITAL | Age: 24
Discharge: HOME | End: 2024-05-29
Attending: EMERGENCY MEDICINE
Payer: COMMERCIAL

## 2024-05-29 ENCOUNTER — HOSPITAL ENCOUNTER (OUTPATIENT)
Dept: ULTRASOUND IMAGING | Facility: HOSPITAL | Age: 24
Discharge: HOME | End: 2024-05-29
Attending: EMERGENCY MEDICINE
Payer: COMMERCIAL

## 2024-05-29 ENCOUNTER — TELEPHONE (OUTPATIENT)
Age: 24
End: 2024-05-29

## 2024-05-29 ENCOUNTER — APPOINTMENT (OUTPATIENT)
Dept: LAB | Facility: HOSPITAL | Age: 24
End: 2024-05-29
Attending: EMERGENCY MEDICINE
Payer: COMMERCIAL

## 2024-05-29 ENCOUNTER — TELEPHONE (OUTPATIENT)
Dept: ULTRASOUND IMAGING | Facility: HOSPITAL | Age: 24
End: 2024-05-29

## 2024-05-29 VITALS
OXYGEN SATURATION: 100 % | SYSTOLIC BLOOD PRESSURE: 139 MMHG | DIASTOLIC BLOOD PRESSURE: 91 MMHG | HEART RATE: 86 BPM | RESPIRATION RATE: 18 BRPM | TEMPERATURE: 98.4 F

## 2024-05-29 DIAGNOSIS — N89.8 VAGINAL DISCHARGE: ICD-10-CM

## 2024-05-29 DIAGNOSIS — Z98.890 STATUS POST ELECTIVE ABORTION: ICD-10-CM

## 2024-05-29 LAB
ANION GAP SERPL CALCULATED.3IONS-SCNC: 8 MMOL/L (ref 4–13)
ANISOCYTOSIS BLD QL SMEAR: PRESENT
B-HCG SERPL-ACNC: 54.7 MIU/ML (ref 0–5)
B-HCG SERPL-ACNC: 61.4 MIU/ML (ref 0–5)
BACTERIA UR QL AUTO: NORMAL /HPF
BASOPHILS # BLD MANUAL: 0 THOUSAND/UL (ref 0–0.1)
BASOPHILS NFR MAR MANUAL: 0 % (ref 0–1)
BILIRUB UR QL STRIP: NEGATIVE
BUN SERPL-MCNC: 9 MG/DL (ref 5–25)
C GLABRATA DNA VAG QL NAA+PROBE: NEGATIVE
C KRUSEI DNA VAG QL NAA+PROBE: NEGATIVE
C TRACH DNA SPEC QL NAA+PROBE: POSITIVE
CALCIUM SERPL-MCNC: 9.9 MG/DL (ref 8.4–10.2)
CANDIDA SP 6 PNL VAG NAA+PROBE: POSITIVE
CHLORIDE SERPL-SCNC: 102 MMOL/L (ref 96–108)
CLARITY UR: CLEAR
CO2 SERPL-SCNC: 27 MMOL/L (ref 21–32)
COLOR UR: COLORLESS
CREAT SERPL-MCNC: 0.69 MG/DL (ref 0.6–1.3)
EOSINOPHIL # BLD MANUAL: 0.36 THOUSAND/UL (ref 0–0.4)
EOSINOPHIL NFR BLD MANUAL: 6 % (ref 0–6)
ERYTHROCYTE [DISTWIDTH] IN BLOOD BY AUTOMATED COUNT: 12.6 % (ref 11.6–15.1)
GFR SERPL CREATININE-BSD FRML MDRD: 121 ML/MIN/1.73SQ M
GIANT PLATELETS BLD QL SMEAR: PRESENT
GLUCOSE SERPL-MCNC: 74 MG/DL (ref 65–140)
GLUCOSE UR STRIP-MCNC: NEGATIVE MG/DL
HCT VFR BLD AUTO: 38.2 % (ref 34.8–46.1)
HGB BLD-MCNC: 12.2 G/DL (ref 11.5–15.4)
HGB UR QL STRIP.AUTO: ABNORMAL
KETONES UR STRIP-MCNC: NEGATIVE MG/DL
LEUKOCYTE ESTERASE UR QL STRIP: ABNORMAL
LG PLATELETS BLD QL SMEAR: PRESENT
LYMPHOCYTES # BLD AUTO: 1.21 THOUSAND/UL (ref 0.6–4.47)
LYMPHOCYTES # BLD AUTO: 20 % (ref 14–44)
MACROCYTES BLD QL AUTO: PRESENT
MCH RBC QN AUTO: 28.5 PG (ref 26.8–34.3)
MCHC RBC AUTO-ENTMCNC: 31.9 G/DL (ref 31.4–37.4)
MCV RBC AUTO: 89 FL (ref 82–98)
MONOCYTES # BLD AUTO: 0.06 THOUSAND/UL (ref 0–1.22)
MONOCYTES NFR BLD: 1 % (ref 4–12)
N GONORRHOEA DNA SPEC QL NAA+PROBE: NEGATIVE
NEUTROPHILS # BLD MANUAL: 4.4 THOUSAND/UL (ref 1.85–7.62)
NEUTS BAND NFR BLD MANUAL: 3 % (ref 0–8)
NEUTS SEG NFR BLD AUTO: 70 % (ref 43–75)
NITRITE UR QL STRIP: NEGATIVE
NON-SQ EPI CELLS URNS QL MICRO: NORMAL /HPF
PH UR STRIP.AUTO: 6 [PH]
PLATELET # BLD AUTO: 281 THOUSANDS/UL (ref 149–390)
PLATELET BLD QL SMEAR: ADEQUATE
PMV BLD AUTO: 11.1 FL (ref 8.9–12.7)
POTASSIUM SERPL-SCNC: 3.4 MMOL/L (ref 3.5–5.3)
PROT UR STRIP-MCNC: NEGATIVE MG/DL
RBC # BLD AUTO: 4.28 MILLION/UL (ref 3.81–5.12)
RBC #/AREA URNS AUTO: NORMAL /HPF
RBC MORPH BLD: PRESENT
SODIUM SERPL-SCNC: 137 MMOL/L (ref 135–147)
SP GR UR STRIP.AUTO: 1.01 (ref 1–1.03)
STOMATOCYTES BLD QL SMEAR: PRESENT
T VAGINALIS DNA VAG QL NAA+PROBE: NEGATIVE
UROBILINOGEN UR STRIP-ACNC: <2 MG/DL
VAGINOSIS/ITIS DNA PNL VAG PROBE+SIG AMP: POSITIVE
WBC # BLD AUTO: 6.03 THOUSAND/UL (ref 4.31–10.16)
WBC #/AREA URNS AUTO: NORMAL /HPF

## 2024-05-29 PROCEDURE — 99284 EMERGENCY DEPT VISIT MOD MDM: CPT | Performed by: EMERGENCY MEDICINE

## 2024-05-29 PROCEDURE — 76856 US EXAM PELVIC COMPLETE: CPT

## 2024-05-29 PROCEDURE — 80048 BASIC METABOLIC PNL TOTAL CA: CPT | Performed by: EMERGENCY MEDICINE

## 2024-05-29 PROCEDURE — 85007 BL SMEAR W/DIFF WBC COUNT: CPT | Performed by: EMERGENCY MEDICINE

## 2024-05-29 PROCEDURE — 81514 NFCT DS BV&VAGINITIS DNA ALG: CPT | Performed by: EMERGENCY MEDICINE

## 2024-05-29 PROCEDURE — 99284 EMERGENCY DEPT VISIT MOD MDM: CPT

## 2024-05-29 PROCEDURE — 87491 CHLMYD TRACH DNA AMP PROBE: CPT | Performed by: EMERGENCY MEDICINE

## 2024-05-29 PROCEDURE — 36415 COLL VENOUS BLD VENIPUNCTURE: CPT | Performed by: EMERGENCY MEDICINE

## 2024-05-29 PROCEDURE — 76830 TRANSVAGINAL US NON-OB: CPT

## 2024-05-29 PROCEDURE — 87591 N.GONORRHOEAE DNA AMP PROB: CPT | Performed by: EMERGENCY MEDICINE

## 2024-05-29 PROCEDURE — 84702 CHORIONIC GONADOTROPIN TEST: CPT

## 2024-05-29 PROCEDURE — 84702 CHORIONIC GONADOTROPIN TEST: CPT | Performed by: EMERGENCY MEDICINE

## 2024-05-29 PROCEDURE — 81001 URINALYSIS AUTO W/SCOPE: CPT | Performed by: EMERGENCY MEDICINE

## 2024-05-29 PROCEDURE — 85027 COMPLETE CBC AUTOMATED: CPT | Performed by: EMERGENCY MEDICINE

## 2024-05-29 RX ORDER — DOXYCYCLINE HYCLATE 100 MG/1
100 CAPSULE ORAL ONCE
Status: COMPLETED | OUTPATIENT
Start: 2024-05-29 | End: 2024-05-29

## 2024-05-29 RX ORDER — METRONIDAZOLE 500 MG/1
500 TABLET ORAL ONCE
Status: COMPLETED | OUTPATIENT
Start: 2024-05-29 | End: 2024-05-29

## 2024-05-29 RX ORDER — MEDROXYPROGESTERONE ACETATE 10 MG/1
10 TABLET ORAL DAILY
Qty: 14 TABLET | Refills: 0 | Status: CANCELLED | OUTPATIENT
Start: 2024-05-29

## 2024-05-29 RX ORDER — METRONIDAZOLE 500 MG/1
500 TABLET ORAL EVERY 8 HOURS SCHEDULED
Qty: 30 TABLET | Refills: 0 | Status: SHIPPED | OUTPATIENT
Start: 2024-05-29 | End: 2024-06-08

## 2024-05-29 RX ORDER — DOXYCYCLINE HYCLATE 100 MG/1
100 CAPSULE ORAL 2 TIMES DAILY
Qty: 20 CAPSULE | Refills: 0 | Status: SHIPPED | OUTPATIENT
Start: 2024-05-29 | End: 2024-06-08

## 2024-05-29 RX ADMIN — METRONIDAZOLE 500 MG: 500 TABLET ORAL at 04:03

## 2024-05-29 RX ADMIN — DOXYCYCLINE HYCLATE 100 MG: 100 CAPSULE ORAL at 04:02

## 2024-05-29 NOTE — DISCHARGE INSTRUCTIONS
You have been seen for evaluation of vaginal bleeding and discharge. Please complete the pelvic ultrasound as ordered for your symptoms. Please complete a repeat HCG test to ensure resolution of your . Please complete the course of doxycyline and metronidazole for vaginal discharge. Return to the emergency department if you develop worsening bleeding/discharge, abdominal pain, fevers or any other symptoms of concern. Please follow up with OB/GYN by calling the number provided.

## 2024-05-29 NOTE — ED NOTES
Vaginal exam done by dr merritt. Specimen obtained and sent to lab for analysis     Juliet Giles RN  05/29/24 3805

## 2024-05-29 NOTE — TELEPHONE ENCOUNTER
Pt seen in ER yesterday, needs f/u Ultrasound done. Phone number not working at this time, MD [employer] message sent to patient to contact Central Scheduling for appointment.

## 2024-05-29 NOTE — ED PROVIDER NOTES
History  Chief Complaint   Patient presents with    Vaginal Bleeding     Pt states that she had an  in April and has been bleeding since. She states it is itchy and has a bad smell. Pt complains of some cramping with it that comes and goes. Going through 1 pad every 3 hours.      Delmi Tejeda is a 23 y.o. year old  female presenting to the [facility] ED for vaginal bleeding. Patient underwent elective   at [facility] in [place]. She was given pills at that time though cannot recall which medication she was given. Patient had abdominal cramping and heavy bleeding afterwards which has since subsided. Since that time patient reported to have small amount of daily vaginal bleeding. She occasionally passes clots of blood. Patient presents to ED with persistent daily vaginal bleeding. For the past two days she has noticed itching in the vaginal area and is passing maldorous vaginal discharge. No associated fevers. She is reporting occasional abdominal cramping though denies abdominal pain at time of evaluation in ED. No associated nausea or vomiting. Patient has not taken/received any medications at home for relief of symptoms.      History provided by:  Medical records and patient   used: No        Prior to Admission Medications   Prescriptions Last Dose Informant Patient Reported? Taking?   ibuprofen (MOTRIN) 600 mg tablet   No No   Sig: Take 1 tablet (600 mg total) by mouth every 6 (six) hours as needed for moderate pain   ondansetron (ZOFRAN-ODT) 4 mg disintegrating tablet   No No   Sig: Take 1 tablet (4 mg total) by mouth every 8 (eight) hours as needed for nausea or vomiting for up to 20 doses   phenazopyridine (PYRIDIUM) 200 mg tablet   No No   Sig: Take 1 tablet (200 mg total) by mouth 3 (three) times a day      Facility-Administered Medications: None       History reviewed. No pertinent past medical history.    History reviewed. No pertinent  surgical history.    History reviewed. No pertinent family history.  I have reviewed and agree with the history as documented.    E-Cigarette/Vaping    E-Cigarette Use Current Some Day User      E-Cigarette/Vaping Substances    Nicotine Yes      Social History     Tobacco Use    Smoking status: Never    Smokeless tobacco: Never   Vaping Use    Vaping status: Some Days    Substances: Nicotine   Substance Use Topics    Alcohol use: Never    Drug use: Yes     Types: Marijuana       Review of Systems   Constitutional:  Negative for chills and fever.   Respiratory:  Negative for shortness of breath.    Cardiovascular:  Negative for chest pain.   Gastrointestinal:  Positive for abdominal pain. Negative for diarrhea, nausea and vomiting.   Genitourinary:  Positive for vaginal bleeding and vaginal discharge. Negative for dysuria, flank pain and hematuria.   Musculoskeletal:  Negative for back pain.   Skin:  Negative for rash.   All other systems reviewed and are negative.      Physical Exam  Physical Exam  Vitals and nursing note reviewed. Exam conducted with a chaperone present.   Constitutional:       General: She is not in acute distress.     Appearance: Normal appearance. She is well-developed. She is not ill-appearing, toxic-appearing or diaphoretic.   HENT:      Head: Normocephalic and atraumatic.      Nose: No congestion or rhinorrhea.   Eyes:      General:         Right eye: No discharge.         Left eye: No discharge.   Cardiovascular:      Rate and Rhythm: Normal rate and regular rhythm.   Pulmonary:      Effort: Pulmonary effort is normal. No accessory muscle usage or respiratory distress.      Breath sounds: Normal breath sounds. No stridor. No decreased breath sounds, wheezing, rhonchi or rales.   Abdominal:      General: There is no distension.      Palpations: Abdomen is soft.      Tenderness: There is abdominal tenderness in the suprapubic area. There is no right CVA tenderness, left CVA tenderness,  guarding or rebound.   Genitourinary:     Vagina: No signs of injury and foreign body. No vaginal discharge, erythema, tenderness, bleeding or lesions.      Cervix: No cervical motion tenderness, discharge, friability, erythema or cervical bleeding.      Comments: No purulent drainage noted.  No cervical motion tenderness.  Musculoskeletal:      Cervical back: Normal range of motion and neck supple. No rigidity.      Right lower leg: No tenderness. No edema.      Left lower leg: No tenderness. No edema.   Skin:     Capillary Refill: Capillary refill takes less than 2 seconds.      Findings: No rash.   Neurological:      Mental Status: She is alert and oriented to person, place, and time.   Psychiatric:         Mood and Affect: Mood normal.         Behavior: Behavior normal.         Vital Signs  ED Triage Vitals   Temperature Pulse Respirations Blood Pressure SpO2   05/29/24 0234 05/29/24 0239 05/29/24 0234 05/29/24 0234 05/29/24 0239   98.4 °F (36.9 °C) 86 18 139/91 100 %      Temp Source Heart Rate Source Patient Position - Orthostatic VS BP Location FiO2 (%)   05/29/24 0234 05/29/24 0234 05/29/24 0234 05/29/24 0234 --   Temporal Monitor Lying Left arm       Pain Score       --                  Vitals:    05/29/24 0234 05/29/24 0239   BP: 139/91    Pulse:  86   Patient Position - Orthostatic VS: Lying          Visual Acuity      ED Medications  Medications   doxycycline hyclate (VIBRAMYCIN) capsule 100 mg (100 mg Oral Given 5/29/24 0402)   metroNIDAZOLE (FLAGYL) tablet 500 mg (500 mg Oral Given 5/29/24 0403)       Diagnostic Studies  Results Reviewed       Procedure Component Value Units Date/Time    RBC Morphology Reflex Test [[id number]] Collected: 05/29/24 0259    Lab Status: Final result Specimen: Blood from Arm, Right Updated: 05/29/24 0401    CBC and differential [[id number]]  (Normal) Collected: 05/29/24 0259    Lab Status: Final result Specimen: Blood from Arm, Right Updated: 05/29/24 0357     WBC 6.03  Thousand/uL      RBC 4.28 Million/uL      Hemoglobin 12.2 g/dL      Hematocrit 38.2 %      MCV 89 fL      MCH 28.5 pg      MCHC 31.9 g/dL      RDW 12.6 %      MPV 11.1 fL      Platelets 281 Thousands/uL     Narrative:      This is an appended report.  These results have been appended to a previously verified report.    Manual Differential(PHLEBS Do Not Order) [[id number]]  (Abnormal) Collected: 05/29/24 0259    Lab Status: Final result Specimen: Blood from Arm, Right Updated: 05/29/24 0357     Segmented % 70 %      Bands % 3 %      Lymphocytes % 20 %      Monocytes % 1 %      Eosinophils % 6 %      Basophils % 0 %      Absolute Neutrophils 4.40 Thousand/uL      Absolute Lymphocytes 1.21 Thousand/uL      Absolute Monocytes 0.06 Thousand/uL      Absolute Eosinophils 0.36 Thousand/uL      Absolute Basophils 0.00 Thousand/uL      Total Counted --     RBC Morphology Present     Platelet Estimate Adequate     Giant PLTs Present     Large Platelet Present     Anisocytosis Present     Macrocytes Present     Stomatocytes Present    hCG, quantitative [[id number]]  (Abnormal) Collected: 05/29/24 0259    Lab Status: Final result Specimen: Blood from Arm, Right Updated: 05/29/24 0333     HCG, Quant 61.4 mIU/mL     Narrative:       Expected Ranges:    HCG results between 5.0 and 25.0 mIU/mL may be indicative of early pregnancy but should be interpreted in light of the total clinical presentation.    HCG can rise to detectable levels in koffi and post menopausal women (0-11.6 mIU/mL).     Approximate               Approximate HCG  Gestation age          Concentration ( mIU/mL)  _____________          ______________________   Weeks                      HCG values  0.2-1                       5-50  1-2                           2-3                         100-5000  3-4                         500-36299  4-5                         1000-11210  5-6                         [id number]  6-8                         [id number]  8-12                         [id number]      Basic metabolic panel [[id number]]  (Abnormal) Collected: 05/29/24 0259    Lab Status: Final result Specimen: Blood from Arm, Right Updated: 05/29/24 0326     Sodium 137 mmol/L      Potassium 3.4 mmol/L      Chloride 102 mmol/L      CO2 27 mmol/L      ANION GAP 8 mmol/L      BUN 9 mg/dL      Creatinine 0.69 mg/dL      Glucose 74 mg/dL      Calcium 9.9 mg/dL      eGFR 121 ml/min/1.73sq m     Narrative:      National Kidney Disease Foundation guidelines for Chronic Kidney Disease (CKD):     Stage 1 with normal or high GFR (GFR > 90 mL/min/1.73 square meters)    Stage 2 Mild CKD (GFR = 60-89 mL/min/1.73 square meters)    Stage 3A Moderate CKD (GFR = 45-59 mL/min/1.73 square meters)    Stage 3B Moderate CKD (GFR = 30-44 mL/min/1.73 square meters)    Stage 4 Severe CKD (GFR = 15-29 mL/min/1.73 square meters)    Stage 5 End Stage CKD (GFR <15 mL/min/1.73 square meters)  Note: GFR calculation is accurate only with a steady state creatinine    Molecular Vaginal Panel [[id number]] Collected: 05/29/24 0318    Lab Status: In process Specimen: Genital from Vaginal Updated: 05/29/24 0321    Urine Microscopic [[id number]]  (Normal) Collected: 05/29/24 0301    Lab Status: Final result Specimen: Urine, Clean Catch Updated: 05/29/24 0316     RBC, UA 0-1 /hpf      WBC, UA 2-4 /hpf      Epithelial Cells Occasional /hpf      Bacteria, UA Occasional /hpf     UA w Reflex to Microscopic w Reflex to Culture [[id number]]  (Abnormal) Collected: 05/29/24 0301    Lab Status: Final result Specimen: Urine, Clean Catch Updated: 05/29/24 0309     Color, UA Colorless     Clarity, UA Clear     Specific Gravity, UA 1.015     pH, UA 6.0     Leukocytes, UA Small     Nitrite, UA Negative     Protein, UA Negative mg/dl      Glucose, UA Negative mg/dl      Ketones, UA Negative mg/dl      Urobilinogen, UA <2.0 mg/dl      Bilirubin, UA Negative     Occult Blood, UA Moderate    Chlamydia/GC amplified DNA by PCR  "[[id number]] Collected: 24    Lab Status: In process Specimen: Urine, Other Updated: 24                   US OB Follow up with Transvaginal    (Results Pending)              Procedures  Procedures         ED Course  ED Course as of 05/29/24 0428   Wed May 29, 2024   0308 WBC: 6.03   0338 HCG QUANTITATIVE(!): 61.4                               SBIRT 22yo+      Flowsheet Row Most Recent Value   Initial Alcohol Screen: US AUDIT-C     1. How often do you have a drink containing alcohol? 0 Filed at: 2024   2. How many drinks containing alcohol do you have on a typical day you are drinking?  0 Filed at: 2024   3a. Male UNDER 65: How often do you have five or more drinks on one occasion? 0 Filed at: 2024   3b. FEMALE Any Age, or MALE 65+: How often do you have 4 or more drinks on one occassion? 0 Filed at: 2024   Audit-C Score 0 Filed at: 2024   NOAM: How many times in the past year have you...    Used an illegal drug or used a prescription medication for non-medical reasons? Never Filed at: 2024                      Medical Decision Making    23 y.o. female presenting for vaginal bleeding post elective .  Afebrile, VSS.  Denying abdominal tenderness at time of evaluation in ED.    Patient is unable to provide records from [facility] at time of evaluation in ED.  Chart reviewed, 3/21/24 US demonstrated \"8 mm intrauterine gestational sac within the fundal portion of the endometrial canal; this is too small for accurate dating. A yolk sac is present within the intrauterine gestational sac.\" As such do not suspect ectopic pregnancy.    Ddx would include retained products of conception.  Will check quantitative HCG to screen for resolution of .  Will obtain labs to screen for leukocytosis, anemia, electrolyte abnormality.  Will check UA to evaluate for UTI.  Will order urine G/C and molecular vaginal panel to screen " for PID.    Will use labs to determine urgency of pelvic US. If patient demonstrating s/s severe anemia or sepsis will require STAT ultrasound. If not will refer for prompt outpatient US as no in house sonographer overnight.    Reassessment: VSS, well appearing. No bleeding or purulent drainage visualized on pelvic exam.   Labs reviewed with patient.  Patient understands G/C and molecular vaginal panel pending at time of discharge.  No evidence of sepsis. As patient is hemodynamically stable and given chronicity of symptoms will treat with antibiotics as patient is at risk for endometritis.  Will provide order for outpatient pelvic ultrasound to evaluate for retained products of conception and continue to trend hCG. This was reviewed in detail with the patient and emphasized importance of promptly completing this testing.  Emphasized importance of prompt outpatient OB/GYN followup.    Disposition: I have discussed with the patient our plan to discharge them from the ED and the patient is in agreement with this plan.     Discharge Plan: Rx for outpatient pelvic US and serial HCG. No evidence of sepsis at this time though will treat with course of antibiotics as patient is at risk for endometritis. RTED precautions emphasized. The patient was provided a written after visit summary with strict RTED precautions.     Followup: I have discussed with the patient plan to follow up with OB/GYN. Contact information provided in AVS.    Amount and/or Complexity of Data Reviewed  Labs: ordered. Decision-making details documented in ED Course.  Radiology: ordered.    Risk  Prescription drug management.             Disposition  Final diagnoses:   Abnormal uterine bleeding, postpartum   Vaginal discharge   Status post elective      Time reflects when diagnosis was documented in both MDM as applicable and the Disposition within this note       Time User Action Codes Description Comment    2024  3:45 AM Eloy Horvath  Add [O72.1] Abnormal uterine bleeding, postpartum     2024  3:45 AM Eloy Horvath Add [N89.8] Vaginal discharge     2024  3:57 AM Eloy Horvath Add [Z98.890] Status post elective            ED Disposition       ED Disposition   Discharge    Condition   Stable    Date/Time   Wed May 29, 2024 8667    Comment   Delmi Tejeda discharge to home/self care.                   Follow-up Information       Follow up With Specialties Details Why Contact Info Additional Information    [facility] Schedule an appointment as soon as possible for a visit  To make appointment for reevaluation in 3-5 days. [address] [place] [id number] [phone] [facility], [address] [place] [address], [id number] [phone]            Discharge Medication List as of 2024  3:58 AM        START taking these medications    Details   doxycycline hyclate (VIBRAMYCIN) 100 mg capsule Take 1 capsule (100 mg total) by mouth 2 (two) times a day for 10 days, Starting 2024, Until Sat 2024, Normal      metroNIDAZOLE (FLAGYL) 500 mg tablet Take 1 tablet (500 mg total) by mouth every 8 (eight) hours for 10 days, Starting 2024, Until Sat 2024, Normal           CONTINUE these medications which have NOT CHANGED    Details   ibuprofen (MOTRIN) 600 mg tablet Take 1 tablet (600 mg total) by mouth every 6 (six) hours as needed for moderate pain, Starting 2024, Normal      ondansetron (ZOFRAN-ODT) 4 mg disintegrating tablet Take 1 tablet (4 mg total) by mouth every 8 (eight) hours as needed for nausea or vomiting for up to 20 doses, Starting 2024, Normal      phenazopyridine (PYRIDIUM) 200 mg tablet Take 1 tablet (200 mg total) by mouth 3 (three) times a day, Starting 2024, Normal             Outpatient Discharge Orders   US OB Follow up with Transvaginal    Standing Status: Future Standing Exp. Date: 05/29/28     hCG, quantitative   Standing Status: Future Standing Exp. Date: 05/29/25       PDMP Review       None            ED Provider  Electronically Signed by             Eloy Horvath DO  05/29/24 0428

## 2024-06-04 ENCOUNTER — TELEPHONE (OUTPATIENT)
Dept: GYNECOLOGY | Facility: CLINIC | Age: 24
End: 2024-06-04

## 2024-06-04 NOTE — TELEPHONE ENCOUNTER
Attempted to call patient regarding todays missed appt. Wanted to offer her two other times today that she could have, 11:30 or 2:30, ok per Arabella.  Phone number is not in service.  Sent her Bfly message with appt options for today and told her to call office ASAP if she wanted either of those

## 2024-06-12 ENCOUNTER — APPOINTMENT (OUTPATIENT)
Dept: LAB | Facility: MEDICAL CENTER | Age: 24
End: 2024-06-12
Payer: COMMERCIAL

## 2024-06-12 ENCOUNTER — ULTRASOUND (OUTPATIENT)
Dept: GYNECOLOGY | Facility: CLINIC | Age: 24
End: 2024-06-12
Payer: COMMERCIAL

## 2024-06-12 ENCOUNTER — APPOINTMENT (OUTPATIENT)
Dept: LAB | Facility: HOSPITAL | Age: 24
End: 2024-06-12
Payer: COMMERCIAL

## 2024-06-12 ENCOUNTER — OFFICE VISIT (OUTPATIENT)
Dept: GYNECOLOGY | Facility: CLINIC | Age: 24
End: 2024-06-12
Payer: COMMERCIAL

## 2024-06-12 VITALS
SYSTOLIC BLOOD PRESSURE: 100 MMHG | WEIGHT: 122 LBS | DIASTOLIC BLOOD PRESSURE: 60 MMHG | HEIGHT: 61 IN | BODY MASS INDEX: 23.03 KG/M2

## 2024-06-12 DIAGNOSIS — Z33.2 ELECTIVE ABORTION: ICD-10-CM

## 2024-06-12 DIAGNOSIS — O07.4 RETAINED PRODUCTS OF CONCEPTION AFTER INDUCED TERMINATION OF PREGNANCY: Primary | ICD-10-CM

## 2024-06-12 LAB
ABO GROUP BLD: NORMAL
B-HCG SERPL-ACNC: 13.4 MIU/ML (ref 0–5)
BASOPHILS # BLD AUTO: 0.06 THOUSANDS/ÂΜL (ref 0–0.1)
BASOPHILS NFR BLD AUTO: 2 % (ref 0–1)
BLD GP AB SCN SERPL QL: NEGATIVE
EOSINOPHIL # BLD AUTO: 0.23 THOUSAND/ÂΜL (ref 0–0.61)
EOSINOPHIL NFR BLD AUTO: 6 % (ref 0–6)
ERYTHROCYTE [DISTWIDTH] IN BLOOD BY AUTOMATED COUNT: 13.1 % (ref 11.6–15.1)
HCT VFR BLD AUTO: 39.3 % (ref 34.8–46.1)
HGB BLD-MCNC: 12.4 G/DL (ref 11.5–15.4)
IMM GRANULOCYTES # BLD AUTO: 0.01 THOUSAND/UL (ref 0–0.2)
IMM GRANULOCYTES NFR BLD AUTO: 0 % (ref 0–2)
LYMPHOCYTES # BLD AUTO: 1.18 THOUSANDS/ÂΜL (ref 0.6–4.47)
LYMPHOCYTES NFR BLD AUTO: 31 % (ref 14–44)
MCH RBC QN AUTO: 28.9 PG (ref 26.8–34.3)
MCHC RBC AUTO-ENTMCNC: 31.6 G/DL (ref 31.4–37.4)
MCV RBC AUTO: 92 FL (ref 82–98)
MONOCYTES # BLD AUTO: 0.31 THOUSAND/ÂΜL (ref 0.17–1.22)
MONOCYTES NFR BLD AUTO: 8 % (ref 4–12)
NEUTROPHILS # BLD AUTO: 1.98 THOUSANDS/ÂΜL (ref 1.85–7.62)
NEUTS SEG NFR BLD AUTO: 53 % (ref 43–75)
NRBC BLD AUTO-RTO: 0 /100 WBCS
PLATELET # BLD AUTO: 229 THOUSANDS/UL (ref 149–390)
PMV BLD AUTO: 11.5 FL (ref 8.9–12.7)
RBC # BLD AUTO: 4.29 MILLION/UL (ref 3.81–5.12)
RH BLD: POSITIVE
SPECIMEN EXPIRATION DATE: NORMAL
WBC # BLD AUTO: 3.77 THOUSAND/UL (ref 4.31–10.16)

## 2024-06-12 PROCEDURE — 84702 CHORIONIC GONADOTROPIN TEST: CPT | Performed by: OBSTETRICS & GYNECOLOGY

## 2024-06-12 PROCEDURE — 76830 TRANSVAGINAL US NON-OB: CPT | Performed by: OBSTETRICS & GYNECOLOGY

## 2024-06-12 PROCEDURE — 85025 COMPLETE CBC W/AUTO DIFF WBC: CPT | Performed by: OBSTETRICS & GYNECOLOGY

## 2024-06-12 PROCEDURE — 86900 BLOOD TYPING SEROLOGIC ABO: CPT

## 2024-06-12 PROCEDURE — 86901 BLOOD TYPING SEROLOGIC RH(D): CPT

## 2024-06-12 PROCEDURE — 86850 RBC ANTIBODY SCREEN: CPT

## 2024-06-12 PROCEDURE — 99203 OFFICE O/P NEW LOW 30 MIN: CPT | Performed by: OBSTETRICS & GYNECOLOGY

## 2024-06-12 PROCEDURE — 36415 COLL VENOUS BLD VENIPUNCTURE: CPT | Performed by: OBSTETRICS & GYNECOLOGY

## 2024-06-12 NOTE — PROGRESS NOTES
Assessment/Plan:    Retained products of conception and need for D&C  explained to pt. Spoke with Dr. Gardner and pt is scheduled for consult tomorrow at 11:15. Instructed pt to go to ED with any heavy bleeding. Risks of hemorrhage and death reviewed. Pt verbalized understanding. Script given for CBC/hCG to have drawn today.     Diagnoses and all orders for this visit:    Retained products of conception after induced termination of pregnancy    Elective   -     CBC and differential  -     hCG, quantitative  -     Type and screen; Future        Subjective:      Patient ID: Delmi Tejeda is a 23 y.o. female.    New pt presents with continued bleeding since medical  on 24 at [facility].   Bleeding varies in color and flow. Today is described as brown spotting. Has mild cramping. She is in NAD.   Last hCG 24 54.7.  She states she was told she was 7 weeks at [facility].   Pascual Nelson ultrasound tech was in office today and was able to do TVU with results as follows:    Uterine findings:     Length (cm): 9.56    Height (cm):  5.96    Width (cm):  5.76    Endometrial stripe: identified      Endometrium thickness (mm):  26.22  Left ovary findings:     Left ovary:  Visualized    Length (cm): 3.39    Height (cm): 2.22    Width (cm): 2.51  Right ovary findings:     Right ovary:  Visualized    Length (cm): 4.62    Height (cm): 2.1    Width (cm): 2.4  Other findings:     Free pelvic fluid: not identified      Free peritoneal fluid: not identified    Post-Procedure Details:     Impression:  Anteverted uterus demonstrates a thickened endometrium demonstrating mixed echogenicity and debris swirling around within endometrium consistent with retained products of conception from recent medical . There is not a distinct gestational sac noted. The bilateral ovaries meet the sonographic criteria for PCOS without additional cysts or masses. No free fluid.             The following portions  "of the patient's history were reviewed and updated as appropriate: allergies, current medications, past family history, past medical history, past social history, past surgical history and problem list.    Review of Systems   Constitutional: Negative.    HENT: Negative.     Respiratory: Negative.     Cardiovascular: Negative.    Gastrointestinal: Negative.    Endocrine: Negative.    Genitourinary:  Positive for vaginal bleeding. Negative for difficulty urinating, dysuria, frequency, pelvic pain, urgency, vaginal discharge and vaginal pain.   Musculoskeletal: Negative.    Skin: Negative.    Neurological: Negative.    Psychiatric/Behavioral: Negative.           Objective:      /60   Ht 5' 1\" (1.549 m)   Wt 55.3 kg (122 lb)   LMP  (LMP Unknown)   BMI 23.05 kg/m²          Physical Exam  Vitals and nursing note reviewed. Exam conducted with a chaperone present.   Constitutional:       General: She is not in acute distress.     Appearance: Normal appearance. She is not ill-appearing, toxic-appearing or diaphoretic.   HENT:      Head: Normocephalic and atraumatic.   Pulmonary:      Effort: Pulmonary effort is normal.   Abdominal:      Hernia: There is no hernia in the left inguinal area or right inguinal area.   Genitourinary:     General: Normal vulva.      Exam position: Supine.      Pubic Area: No rash.       Labia:         Right: No rash, tenderness, lesion or injury.         Left: No rash, tenderness, lesion or injury.       Urethra: No urethral pain, urethral swelling or urethral lesion.      Vagina: No signs of injury and foreign body. Bleeding (scant amount of brown blood noted in vaginal canal) present. No vaginal discharge, erythema, tenderness or lesions.      Uterus: Tender.    Musculoskeletal:         General: Normal range of motion.      Cervical back: Normal range of motion.   Lymphadenopathy:      Lower Body: No right inguinal adenopathy. No left inguinal adenopathy.   Skin:     General: Skin is " warm and dry.   Neurological:      Mental Status: She is alert and oriented to person, place, and time.   Psychiatric:         Mood and Affect: Mood normal.         Behavior: Behavior normal.         Thought Content: Thought content normal.         Judgment: Judgment normal.

## 2024-06-12 NOTE — PROGRESS NOTES
AMB US Pelvic Non OB    Date/Time: 2024 3:30 PM    Performed by: Alison Nelson  Authorized by: Scott Ortega DO  Universal Protocol:  Consent: Verbal consent obtained.  Consent given by: patient  Patient understanding: patient states understanding of the procedure being performed  Patient identity confirmed: verbally with patient    Procedure details:     SIS Procedure: No    Technique:  Transvaginal US, Non-OB    Position: lithotomy exam    Uterine findings:     Length (cm): 9.56    Height (cm):  5.96    Width (cm):  5.76    Endometrial stripe: identified      Endometrium thickness (mm):  26.22  Left ovary findings:     Left ovary:  Visualized    Length (cm): 3.39    Height (cm): 2.22    Width (cm): 2.51  Right ovary findings:     Right ovary:  Visualized    Length (cm): 4.62    Height (cm): 2.1    Width (cm): 2.4  Other findings:     Free pelvic fluid: not identified      Free peritoneal fluid: not identified    Post-Procedure Details:     Impression:  Anteverted uterus demonstrates a thickened endometrium demonstrating mixed echogenicity and debris swirling around within endometrium consistent with retained products of conception from recent medical . There is not a distinct gestational sac noted. The bilateral ovaries meet the sonographic criteria for PCOS without additional cysts or masses. No free fluid.     Tolerance:  Tolerated well, no immediate complications    Complications: no complications    Additional Procedure Comments:      GE Voluson P8 transvaginal transducer RIC5-RA with Serial Number [id number] was used during procedure and subsequently cleaned with high level disinfection utilizing the [employer]ter[employer] EPR Probe .     Ultrasound performed at:     [facility]  [address] [place] [address]  Phone: [phone]  Fax:  [phone]

## 2024-06-13 ENCOUNTER — OFFICE VISIT (OUTPATIENT)
Dept: OBGYN CLINIC | Facility: CLINIC | Age: 24
End: 2024-06-13
Payer: COMMERCIAL

## 2024-06-13 VITALS
BODY MASS INDEX: 28.77 KG/M2 | SYSTOLIC BLOOD PRESSURE: 102 MMHG | WEIGHT: 152.4 LBS | HEIGHT: 61 IN | DIASTOLIC BLOOD PRESSURE: 58 MMHG

## 2024-06-13 DIAGNOSIS — N93.9 ABNORMAL UTERINE BLEEDING: ICD-10-CM

## 2024-06-13 DIAGNOSIS — O03.4 RETAINED PRODUCTS OF CONCEPTION FOLLOWING ABORTION: Primary | ICD-10-CM

## 2024-06-13 PROBLEM — Z87.51 HISTORY OF PRETERM DELIVERY: Status: ACTIVE | Noted: 2021-08-17

## 2024-06-13 PROBLEM — Z87.59 HISTORY OF PLACENTA ABRUPTION: Status: ACTIVE | Noted: 2022-03-14

## 2024-06-13 PROCEDURE — 99214 OFFICE O/P EST MOD 30 MIN: CPT | Performed by: OBSTETRICS & GYNECOLOGY

## 2024-06-13 PROCEDURE — 88175 CYTOPATH C/V AUTO FLUID REDO: CPT | Performed by: OBSTETRICS & GYNECOLOGY

## 2024-06-13 RX ORDER — DOXYCYCLINE HYCLATE 100 MG/1
200 CAPSULE ORAL ONCE
Status: CANCELLED | OUTPATIENT
Start: 2024-06-20 | End: 2024-06-13

## 2024-06-13 NOTE — H&P (VIEW-ONLY)
"Subjective    Patient is a 22 yo  who presents today with persistent bleeding after a medication  with Mifepristone and Misoprostol at [facility] on 24 at about 7 week gestation. An ultrasound was performed yesterday in the office, which demonstrated an endometrial thickness of 26.22 cm, with concern for retained products of conception, although no gestational sac was noted. In addition, the ovaries bilaterally met sonographic criteria for PCOS. An earlier ultrasound on  also showed a thickened endometrium with concerns for retained products of conception. She was also diagnosed with chlamydia on  and prescribed doxycycline. She reports intermittent bleeding today, but no heavy bleeding.    Her HCG trend is as follows:    15,976 (3/21) --> 61.4 () --> 54.7 () --> 13.4 ()      OB History          2    Para   2    Term   1       1    AB        Living   1         SAB        IAB        Ectopic        Multiple        Live Births   2                        Objective    Vitals:    24 1105   BP: 102/58   BP Location: Right arm   Patient Position: Sitting   Cuff Size: Standard   Weight: 69.1 kg (152 lb 6.4 oz)   Height: 5' 1\" (1.549 m)        Physical Exam  Constitutional:       Appearance: Normal appearance.   Genitourinary:      Vulva normal.      Genitourinary Comments: Dark red blood in vaginal vault; cervix appears normal, not dilated, no tissue visible   HENT:      Head: Normocephalic and atraumatic.   Cardiovascular:      Rate and Rhythm: Normal rate.   Pulmonary:      Effort: Pulmonary effort is normal. No respiratory distress.   Abdominal:      Palpations: Abdomen is soft.      Tenderness: There is no abdominal tenderness.   Musculoskeletal:         General: Normal range of motion.   Neurological:      Mental Status: She is alert.   Skin:     General: Skin is warm and dry.          Assessment    Patient Active Problem List   Diagnosis    History of " placenta abruption    History of  delivery        Plan   - Last Pap smear 21, NILM, repeat collected today  - Reviewed that a D&E is recommended due to her retained products of conception noted on ultrasound  - Consent signed today for exam under anesthesia, suction dilation and curettage, possible hysteroscopy, all other indicated procedures  - Reviewed risks of procedure, including uterine perforation necessitating laparoscopy, injury to surrounding structures, bleeding, and infection  - Reviewed pre-operative instructions and post-operative expectations  - Patient states that her phone is not in service currently, but her friend Zak can be contacted if needed  - Patient would like Depo for contraception after the procedure, can administer in the hospital

## 2024-06-13 NOTE — PROGRESS NOTES
"Subjective    Patient is a 24 yo  who presents today with persistent bleeding after a medication  with Mifepristone and Misoprostol at [facility] on 24 at about 7 week gestation. An ultrasound was performed yesterday in the office, which demonstrated an endometrial thickness of 26.22 cm, with concern for retained products of conception, although no gestational sac was noted. In addition, the ovaries bilaterally met sonographic criteria for PCOS. An earlier ultrasound on  also showed a thickened endometrium with concerns for retained products of conception. She was also diagnosed with chlamydia on  and prescribed doxycycline. She reports intermittent bleeding today, but no heavy bleeding.    Her HCG trend is as follows:    15,976 (3/21) --> 61.4 () --> 54.7 () --> 13.4 ()      OB History          2    Para   2    Term   1       1    AB        Living   1         SAB        IAB        Ectopic        Multiple        Live Births   2                        Objective    Vitals:    24 1105   BP: 102/58   BP Location: Right arm   Patient Position: Sitting   Cuff Size: Standard   Weight: 69.1 kg (152 lb 6.4 oz)   Height: 5' 1\" (1.549 m)        Physical Exam  Constitutional:       Appearance: Normal appearance.   Genitourinary:      Vulva normal.      Genitourinary Comments: Dark red blood in vaginal vault; cervix appears normal, not dilated, no tissue visible   HENT:      Head: Normocephalic and atraumatic.   Cardiovascular:      Rate and Rhythm: Normal rate.   Pulmonary:      Effort: Pulmonary effort is normal. No respiratory distress.   Abdominal:      Palpations: Abdomen is soft.      Tenderness: There is no abdominal tenderness.   Musculoskeletal:         General: Normal range of motion.   Neurological:      Mental Status: She is alert.   Skin:     General: Skin is warm and dry.          Assessment    Patient Active Problem List   Diagnosis    History of " placenta abruption    History of  delivery        Plan   - Last Pap smear 21, NILM, repeat collected today  - Reviewed that a D&E is recommended due to her retained products of conception noted on ultrasound  - Consent signed today for exam under anesthesia, suction dilation and curettage, possible hysteroscopy, all other indicated procedures  - Reviewed risks of procedure, including uterine perforation necessitating laparoscopy, injury to surrounding structures, bleeding, and infection  - Reviewed pre-operative instructions and post-operative expectations  - Patient states that her phone is not in service currently, but her friend Zak can be contacted if needed  - Patient would like Depo for contraception after the procedure, can administer in the hospital

## 2024-06-14 ENCOUNTER — TELEPHONE (OUTPATIENT)
Dept: GYNECOLOGY | Facility: CLINIC | Age: 24
End: 2024-06-14

## 2024-06-14 NOTE — TELEPHONE ENCOUNTER
----- Message from Eliane Gardner MD sent at 2024 11:49 AM EDT -----  Regarding: D&E  AM  [facility] GYN Department  Surgery Scheduling Sheet    Patient Name: Delmi Tejeda  : 2000    Provider: Eliane Gardner MD     Needed: no; Language: N/A    Procedure: exam under anesthesia and dilation and evacuation    Diagnosis: retained products of conception    Special Needs or Equipment: ultrasound    Anesthesia: choice    Length of stay: outpatient  Does patient have comorbid conditions that will require close perioperative monitoring prior to safe discharge: no    The patient has comorbid conditions that will require close perioperative monitoring prior to safe discharge, including N/A.   This may require acute care beyond the usual and routine recovery period. As such, inpatient admission post-operatively is expected and appropriate, and anticipated hospital length of stay will be >2 midnights.    Pre-Admission Testing Needed: no   Labs that should be ordered: type and screen    Order PAT that is recommended in prep for procedure?: No    Medical Clearance Needed: no; Provider: N/A    MA Form Signed (tubals/hysterectomy): Not Indicated    Surgical Drink Given: no     How many days out of work: 1 day(s)     How many days no drivin day(s)       Is pre op appt needed?  no  Interval for post op appt: 2 week(s)     For Surgical Scheduler:     Surgery Scheduled On:  [place]: [facility]    Pre-op Appt:   Post op Appt:  Consult/Medical clearance appt:   ##please schedule for  AM##

## 2024-06-18 NOTE — PRE-PROCEDURE INSTRUCTIONS
No outpatient medications have been marked as taking for the 6/20/24 encounter (Hospital Encounter).    Medication instructions for day surgery reviewed. Please use only a sip of water to take your instructed medications. Avoid all over the counter vitamins, supplements and NSAIDS for one week prior to surgery per anesthesia guidelines. Tylenol is ok to take as needed.     You will receive a call one business day prior to surgery with an arrival time and hospital directions. If your surgery is scheduled on a Monday, the hospital will be calling you on the Friday prior to your surgery. If you have not heard from anyone by 8pm, please call the hospital supervisor through the hospital  at [phone]. ([place] [phone] or [place] [phone]).    Do not eat or drink anything after midnight the night before your surgery, including candy, mints, lifesavers, or chewing gum. Do not drink alcohol 24hrs before your surgery. Try not to smoke at least 24hrs before your surgery.       Follow the pre surgery showering instructions as listed in the “My Surgical Experience Booklet” or otherwise provided by your surgeon's office. Do not use a blade to shave the surgical area 1 week before surgery. It is okay to use a clean electric clippers up to 24 hours before surgery. Do not apply any lotions, creams, including makeup, cologne, deodorant, or perfumes after showering on the day of your surgery. Do not use dry shampoo, hair spray, hair gel, or any type of hair products.     No contact lenses, eye make-up, or artificial eyelashes. Remove nail polish, including gel polish, and any artificial, gel, or acrylic nails if possible. Remove all jewelry including rings and body piercing jewelry.     Wear causal clothing that is easy to take on and off. Consider your type of surgery.    Keep any valuables, jewelry, piercings at home. Please bring any specially ordered equipment (sling, braces) if indicated.    Arrange for a  responsible person to drive you to and from the hospital on the day of your surgery. Please confirm the visitor policy for the day of your procedure when you receive your phone call with an arrival time.     Call the surgeon's office with any new illnesses, exposures, or additional questions prior to surgery.    Please reference your “My Surgical Experience Booklet” for additional information to prepare for your upcoming surgery.

## 2024-06-19 ENCOUNTER — ANESTHESIA EVENT (OUTPATIENT)
Dept: PERIOP | Facility: HOSPITAL | Age: 24
End: 2024-06-19
Payer: COMMERCIAL

## 2024-06-19 PROBLEM — O03.4 RETAINED PRODUCTS OF CONCEPTION FOLLOWING ABORTION: Status: ACTIVE | Noted: 2024-06-19

## 2024-06-19 LAB
LAB AP GYN PRIMARY INTERPRETATION: NORMAL
Lab: NORMAL

## 2024-06-19 RX ORDER — SENNOSIDES 8.6 MG
650 CAPSULE ORAL EVERY 8 HOURS PRN
Qty: 30 TABLET | Refills: 0 | Status: SHIPPED | OUTPATIENT
Start: 2024-06-19

## 2024-06-20 ENCOUNTER — TELEPHONE (OUTPATIENT)
Dept: OBGYN CLINIC | Facility: CLINIC | Age: 24
End: 2024-06-20

## 2024-06-20 ENCOUNTER — HOSPITAL ENCOUNTER (OUTPATIENT)
Facility: HOSPITAL | Age: 24
Setting detail: OUTPATIENT SURGERY
Discharge: HOME | End: 2024-06-20
Attending: OBSTETRICS & GYNECOLOGY | Admitting: OBSTETRICS & GYNECOLOGY
Payer: COMMERCIAL

## 2024-06-20 ENCOUNTER — ANESTHESIA (OUTPATIENT)
Dept: PERIOP | Facility: HOSPITAL | Age: 24
End: 2024-06-20
Payer: COMMERCIAL

## 2024-06-20 VITALS
HEART RATE: 75 BPM | SYSTOLIC BLOOD PRESSURE: 98 MMHG | HEIGHT: 61 IN | OXYGEN SATURATION: 95 % | DIASTOLIC BLOOD PRESSURE: 56 MMHG | RESPIRATION RATE: 16 BRPM | TEMPERATURE: 97.7 F | WEIGHT: 123.9 LBS | BODY MASS INDEX: 23.39 KG/M2

## 2024-06-20 DIAGNOSIS — O03.4 RETAINED PRODUCTS OF CONCEPTION FOLLOWING ABORTION: ICD-10-CM

## 2024-06-20 DIAGNOSIS — Z98.890 S/P D&C (STATUS POST DILATION AND CURETTAGE): Primary | ICD-10-CM

## 2024-06-20 DIAGNOSIS — R68.89 FLU-LIKE SYMPTOMS: ICD-10-CM

## 2024-06-20 PROBLEM — F17.200 SMOKING: Status: ACTIVE | Noted: 2024-06-20

## 2024-06-20 LAB
ABO GROUP BLD: NORMAL
BLD GP AB SCN SERPL QL: NEGATIVE
RH BLD: POSITIVE
SPECIMEN EXPIRATION DATE: NORMAL

## 2024-06-20 PROCEDURE — 59812 TREATMENT OF MISCARRIAGE: CPT | Performed by: OBSTETRICS & GYNECOLOGY

## 2024-06-20 PROCEDURE — 86850 RBC ANTIBODY SCREEN: CPT | Performed by: OBSTETRICS & GYNECOLOGY

## 2024-06-20 PROCEDURE — 86900 BLOOD TYPING SEROLOGIC ABO: CPT | Performed by: OBSTETRICS & GYNECOLOGY

## 2024-06-20 PROCEDURE — 88341 IMHCHEM/IMCYTCHM EA ADD ANTB: CPT | Performed by: PATHOLOGY

## 2024-06-20 PROCEDURE — 88342 IMHCHEM/IMCYTCHM 1ST ANTB: CPT | Performed by: PATHOLOGY

## 2024-06-20 PROCEDURE — 88305 TISSUE EXAM BY PATHOLOGIST: CPT | Performed by: PATHOLOGY

## 2024-06-20 PROCEDURE — 86901 BLOOD TYPING SEROLOGIC RH(D): CPT | Performed by: OBSTETRICS & GYNECOLOGY

## 2024-06-20 RX ORDER — DEXAMETHASONE SODIUM PHOSPHATE 10 MG/ML
INJECTION, SOLUTION INTRAMUSCULAR; INTRAVENOUS AS NEEDED
Status: DISCONTINUED | OUTPATIENT
Start: 2024-06-20 | End: 2024-06-20

## 2024-06-20 RX ORDER — ONDANSETRON 2 MG/ML
4 INJECTION INTRAMUSCULAR; INTRAVENOUS ONCE AS NEEDED
Status: DISCONTINUED | OUTPATIENT
Start: 2024-06-20 | End: 2024-06-20 | Stop reason: SDUPTHER

## 2024-06-20 RX ORDER — ONDANSETRON 2 MG/ML
4 INJECTION INTRAMUSCULAR; INTRAVENOUS ONCE AS NEEDED
Status: DISCONTINUED | OUTPATIENT
Start: 2024-06-20 | End: 2024-06-20 | Stop reason: HOSPADM

## 2024-06-20 RX ORDER — PROPOFOL 10 MG/ML
INJECTION, EMULSION INTRAVENOUS AS NEEDED
Status: DISCONTINUED | OUTPATIENT
Start: 2024-06-20 | End: 2024-06-20

## 2024-06-20 RX ORDER — ACETAMINOPHEN 325 MG/1
975 TABLET ORAL EVERY 6 HOURS PRN
Status: CANCELLED | OUTPATIENT
Start: 2024-06-20

## 2024-06-20 RX ORDER — FENTANYL CITRATE 50 UG/ML
INJECTION, SOLUTION INTRAMUSCULAR; INTRAVENOUS AS NEEDED
Status: DISCONTINUED | OUTPATIENT
Start: 2024-06-20 | End: 2024-06-20

## 2024-06-20 RX ORDER — EPHEDRINE SULFATE 50 MG/ML
INJECTION INTRAVENOUS AS NEEDED
Status: DISCONTINUED | OUTPATIENT
Start: 2024-06-20 | End: 2024-06-20

## 2024-06-20 RX ORDER — LIDOCAINE HYDROCHLORIDE 20 MG/ML
INJECTION, SOLUTION EPIDURAL; INFILTRATION; INTRACAUDAL; PERINEURAL AS NEEDED
Status: DISCONTINUED | OUTPATIENT
Start: 2024-06-20 | End: 2024-06-20

## 2024-06-20 RX ORDER — FENTANYL CITRATE/PF 50 MCG/ML
25 SYRINGE (ML) INJECTION
Status: DISCONTINUED | OUTPATIENT
Start: 2024-06-20 | End: 2024-06-20 | Stop reason: HOSPADM

## 2024-06-20 RX ORDER — DOXYCYCLINE HYCLATE 100 MG/1
200 CAPSULE ORAL ONCE
Status: COMPLETED | OUTPATIENT
Start: 2024-06-20 | End: 2024-06-20

## 2024-06-20 RX ORDER — ONDANSETRON 2 MG/ML
4 INJECTION INTRAMUSCULAR; INTRAVENOUS EVERY 6 HOURS PRN
Status: CANCELLED | OUTPATIENT
Start: 2024-06-20

## 2024-06-20 RX ORDER — PROMETHAZINE HYDROCHLORIDE 25 MG/ML
12.5 INJECTION, SOLUTION INTRAMUSCULAR; INTRAVENOUS ONCE AS NEEDED
Status: DISCONTINUED | OUTPATIENT
Start: 2024-06-20 | End: 2024-06-20 | Stop reason: HOSPADM

## 2024-06-20 RX ORDER — FENTANYL CITRATE/PF 50 MCG/ML
25 SYRINGE (ML) INJECTION
Status: DISCONTINUED | OUTPATIENT
Start: 2024-06-20 | End: 2024-06-20 | Stop reason: SDUPTHER

## 2024-06-20 RX ORDER — MAGNESIUM HYDROXIDE 1200 MG/15ML
LIQUID ORAL AS NEEDED
Status: DISCONTINUED | OUTPATIENT
Start: 2024-06-20 | End: 2024-06-20 | Stop reason: HOSPADM

## 2024-06-20 RX ORDER — MIDAZOLAM HYDROCHLORIDE 2 MG/2ML
INJECTION, SOLUTION INTRAMUSCULAR; INTRAVENOUS AS NEEDED
Status: DISCONTINUED | OUTPATIENT
Start: 2024-06-20 | End: 2024-06-20

## 2024-06-20 RX ORDER — IBUPROFEN 600 MG/1
600 TABLET ORAL EVERY 6 HOURS PRN
Qty: 30 TABLET | Refills: 0 | Status: SHIPPED | OUTPATIENT
Start: 2024-06-20

## 2024-06-20 RX ORDER — ONDANSETRON 2 MG/ML
INJECTION INTRAMUSCULAR; INTRAVENOUS AS NEEDED
Status: DISCONTINUED | OUTPATIENT
Start: 2024-06-20 | End: 2024-06-20

## 2024-06-20 RX ORDER — KETOROLAC TROMETHAMINE 30 MG/ML
INJECTION, SOLUTION INTRAMUSCULAR; INTRAVENOUS AS NEEDED
Status: DISCONTINUED | OUTPATIENT
Start: 2024-06-20 | End: 2024-06-20

## 2024-06-20 RX ORDER — SODIUM CHLORIDE 9 MG/ML
125 INJECTION, SOLUTION INTRAVENOUS CONTINUOUS
Status: DISCONTINUED | OUTPATIENT
Start: 2024-06-20 | End: 2024-06-20 | Stop reason: HOSPADM

## 2024-06-20 RX ORDER — MEDROXYPROGESTERONE ACETATE 150 MG/ML
150 INJECTION, SUSPENSION INTRAMUSCULAR ONCE
Status: COMPLETED | OUTPATIENT
Start: 2024-06-20 | End: 2024-06-20

## 2024-06-20 RX ADMIN — PROPOFOL 200 MG: 10 INJECTION, EMULSION INTRAVENOUS at 08:35

## 2024-06-20 RX ADMIN — MIDAZOLAM HYDROCHLORIDE 2 MG: 1 INJECTION, SOLUTION INTRAMUSCULAR; INTRAVENOUS at 08:31

## 2024-06-20 RX ADMIN — DEXAMETHASONE SODIUM PHOSPHATE 10 MG: 10 INJECTION INTRAMUSCULAR; INTRAVENOUS at 08:36

## 2024-06-20 RX ADMIN — FENTANYL CITRATE 25 MCG: 50 INJECTION INTRAMUSCULAR; INTRAVENOUS at 08:49

## 2024-06-20 RX ADMIN — ONDANSETRON 4 MG: 2 INJECTION INTRAMUSCULAR; INTRAVENOUS at 08:31

## 2024-06-20 RX ADMIN — MEDROXYPROGESTERONE ACETATE 150 MG: 150 INJECTION, SUSPENSION, EXTENDED RELEASE INTRAMUSCULAR at 10:54

## 2024-06-20 RX ADMIN — SODIUM CHLORIDE 125 ML/HR: 0.9 INJECTION, SOLUTION INTRAVENOUS at 07:32

## 2024-06-20 RX ADMIN — EPHEDRINE SULFATE 10 MG: 50 INJECTION, SOLUTION INTRAVENOUS at 08:52

## 2024-06-20 RX ADMIN — KETOROLAC TROMETHAMINE 15 MG: 30 INJECTION, SOLUTION INTRAMUSCULAR; INTRAVENOUS at 09:00

## 2024-06-20 RX ADMIN — SODIUM CHLORIDE: 0.9 INJECTION, SOLUTION INTRAVENOUS at 09:14

## 2024-06-20 RX ADMIN — DOXYCYCLINE 200 MG: 100 CAPSULE ORAL at 07:33

## 2024-06-20 RX ADMIN — LIDOCAINE HYDROCHLORIDE 60 MG: 20 INJECTION, SOLUTION EPIDURAL; INFILTRATION; INTRACAUDAL at 08:35

## 2024-06-20 NOTE — ANESTHESIA POSTPROCEDURE EVALUATION
"Post-Op Assessment Note    CV Status:  Stable    Pain management: adequate       Mental Status:  Alert and awake   Hydration Status:  Euvolemic   PONV Controlled:  Controlled   Airway Patency:  Patent     Post Op Vitals Reviewed: Yes      Staff: Anesthesiologist         BP 98/56   Pulse 75   Temp 97.7 °F (36.5 °C) (Temporal)   Resp 16   Ht 5' 1\" (1.549 m)   Wt 56.2 kg (123 lb 14.4 oz)   LMP  (LMP Unknown)   SpO2 95%   BMI 23.41 kg/m²         "

## 2024-06-20 NOTE — ANESTHESIA PREPROCEDURE EVALUATION
Procedure:  (D&E) (7 WEEKS), psb hysteroscopy, eua (Uterus)    Relevant Problems   PULMONARY  marijuana   (+) Smoking      Obstetrics/Gynecology   (+) Retained products of conception following         Physical Exam    Airway    Mallampati score: II  TM Distance: >3 FB       Dental   Comment: braces, No notable dental hx     Cardiovascular  Cardiovascular exam normal    Pulmonary  Pulmonary exam normal     Other Findings  post-pubertal.      Anesthesia Plan  ASA Score- 2     Anesthesia Type- general with ASA Monitors.         Additional Monitors:     Airway Plan:            Plan Factors-Exercise tolerance (METS): >4 METS.    Chart reviewed.        Patient is a current smoker.  Patient instructed to abstain from smoking on day of procedure. Patient did not smoke on day of surgery.    Obstructive sleep apnea risk education given perioperatively.        Induction- intravenous.    Postoperative Plan-         Informed Consent- Anesthetic plan and risks discussed with patient.

## 2024-06-20 NOTE — ANESTHESIA POSTPROCEDURE EVALUATION
Post-Op Assessment Note    CV Status:  Stable  Pain Score: 0    Pain management: adequate       Mental Status:  Arousable and sleepy   Hydration Status:  Euvolemic   PONV Controlled:  Controlled   Airway Patency:  Patent     Post Op Vitals Reviewed: Yes    No anethesia notable event occurred.    Staff: CRNA               /51 (06/20/24 0911)    Temp (!) 97.2 °F (36.2 °C) (06/20/24 0911)    Pulse 80 (06/20/24 0911)   Resp (!) 23 (06/20/24 0911)    SpO2 100 % (06/20/24 0911)

## 2024-06-20 NOTE — INTERVAL H&P NOTE
H&P reviewed. After examining the patient I find no changes in the patients condition since the H&P had been written.    Vitals:    06/20/24 0718   BP: 117/68   Pulse: 71   Resp: 17   Temp: 97.8 °F (36.6 °C)   SpO2: 100%

## 2024-06-20 NOTE — OP NOTE
OPERATIVE REPORT  PATIENT NAME: Delmi Tejeda    :  2000  MRN: [id number]  Pt Location: [facility] OR ROOM 07    SURGERY DATE: 2024    Surgeons and Role:     * Eliane Gardner MD - Primary     * Pattie Thompson MD - Assisting    Preop Diagnosis:  Retained products of conception following  [O03.4]    Post-Op Diagnosis Codes:     * Retained products of conception following  [O03.4]    Procedure(s):  (D&E) (7 WEEKS). psb hysteroscopy. eua    Specimen(s):  ID Type Source Tests Collected by Time Destination   1 :  Tissue Products of Conception TISSUE EXAM Eliane Gardner MD 2024 [id number]        Estimated Blood Loss:   Minimal    Anesthesia Type:   General LMA    Operative Indications:  Retained products of conception following  [O03.4]    Operative Findings:  Normal appearing labia  Cervix closed  Small amount of blood clot and products of conception suctioned from uterus  Minimal bleeding after procedure      Complications:   None    Procedure and Technique:  The patient was taken to the operating room where she was properly identified. General LMA anesthesia was obtained without difficulty. She was prepped and draped in the normal sterile fashion in the dorsal lithotomy position using the stirrups. Care was taken to avoid excessive flexion or extension of the patients hips and knees or pressure on her extremities. A time out was performed and everyone was in agreement. The patient received PO doxycyline in holding.     A weighted speculum was placed in the patient's vagina and the anterior lip of the cervix was identified and grasped with a long single toothed tenaculum. The cervix was serially dilated to accommodate the suction device. An 8 cm curved tip was selected. This was advanced to the fundus and suction was activated. The products of conception were collected in the suction trap. The uterus was felt to clamp down and the suction tip was removed. The uterus was  massaged and firm. All instruments were removed from the patient's vagina. There was no bleeding noted from tenaculum site.     The patient tolerated the procedure well. Sponge, instrument and needle counts were correct times 2. The patient was cleansed, awakened from anesthesia and taken to the recovery room in stable condition.     I was present for the entire procedure.    Patient Disposition:  PACU         SIGNATURE: Eliane Gardner MD  DATE: June 20, 2024  TIME: 9:02 AM

## 2024-06-20 NOTE — TELEPHONE ENCOUNTER
Attempted to call pt, but number is not in service. Sent [email or web address] message to get pt to call and schedule her Post-op visit with Dr. Gardner.

## 2024-06-20 NOTE — TELEPHONE ENCOUNTER
----- Message from Eliane Gardner MD sent at 6/20/2024  7:21 AM EDT -----  Regarding: post op appt  Please schedule patient for post-op visit in 2 weeks    Thanks!  Eliane

## 2024-06-26 PROCEDURE — 88342 IMHCHEM/IMCYTCHM 1ST ANTB: CPT | Performed by: PATHOLOGY

## 2024-06-26 PROCEDURE — 88341 IMHCHEM/IMCYTCHM EA ADD ANTB: CPT | Performed by: PATHOLOGY

## 2024-06-26 PROCEDURE — 88305 TISSUE EXAM BY PATHOLOGIST: CPT | Performed by: PATHOLOGY

## 2024-12-04 ENCOUNTER — HOSPITAL ENCOUNTER (EMERGENCY)
Facility: HOSPITAL | Age: 24
Discharge: HOME | End: 2024-12-05
Attending: EMERGENCY MEDICINE
Payer: COMMERCIAL

## 2024-12-04 VITALS
SYSTOLIC BLOOD PRESSURE: 124 MMHG | RESPIRATION RATE: 18 BRPM | WEIGHT: 123 LBS | DIASTOLIC BLOOD PRESSURE: 74 MMHG | HEIGHT: 61 IN | OXYGEN SATURATION: 99 % | BODY MASS INDEX: 23.22 KG/M2 | TEMPERATURE: 98.2 F | HEART RATE: 78 BPM

## 2024-12-04 DIAGNOSIS — R30.0 DYSURIA: Primary | ICD-10-CM

## 2024-12-04 DIAGNOSIS — N89.8 VAGINAL DISCHARGE: ICD-10-CM

## 2024-12-04 PROCEDURE — 81025 URINE PREGNANCY TEST: CPT | Performed by: EMERGENCY MEDICINE

## 2024-12-04 PROCEDURE — 99284 EMERGENCY DEPT VISIT MOD MDM: CPT

## 2024-12-05 LAB
BILIRUB UR QL STRIP: NEGATIVE
C GLABRATA DNA VAG QL NAA+PROBE: NEGATIVE
C KRUSEI DNA VAG QL NAA+PROBE: NEGATIVE
C TRACH DNA SPEC QL NAA+PROBE: NEGATIVE
CANDIDA SP 6 PNL VAG NAA+PROBE: POSITIVE
CLARITY UR: CLEAR
COLOR UR: NORMAL
EXT PREGNANCY TEST URINE: NEGATIVE
EXT. CONTROL: NORMAL
GLUCOSE UR STRIP-MCNC: NEGATIVE MG/DL
HGB UR QL STRIP.AUTO: NEGATIVE
HIV 1+2 AB+HIV1 P24 AG SERPL QL IA: NORMAL
HIV1 P24 AG SER QL: NORMAL
KETONES UR STRIP-MCNC: NEGATIVE MG/DL
LEUKOCYTE ESTERASE UR QL STRIP: NEGATIVE
M GENITALIUM DNA SPEC QL NAA+PROBE: POSITIVE
N GONORRHOEA DNA SPEC QL NAA+PROBE: NEGATIVE
NITRITE UR QL STRIP: NEGATIVE
PH UR STRIP.AUTO: 6 [PH]
PROT UR STRIP-MCNC: NEGATIVE MG/DL
SP GR UR STRIP.AUTO: 1.01 (ref 1–1.03)
T VAGINALIS DNA SPEC QL NAA+PROBE: NEGATIVE
T VAGINALIS DNA VAG QL NAA+PROBE: NEGATIVE
TREPONEMA PALLIDUM IGG+IGM AB [PRESENCE] IN SERUM OR PLASMA BY IMMUNOASSAY: NORMAL
UROBILINOGEN UR STRIP-ACNC: <2 MG/DL
VAGINOSIS/ITIS DNA PNL VAG PROBE+SIG AMP: NEGATIVE

## 2024-12-05 PROCEDURE — 87806 HIV AG W/HIV1&2 ANTB W/OPTIC: CPT | Performed by: EMERGENCY MEDICINE

## 2024-12-05 PROCEDURE — 87661 TRICHOMONAS VAGINALIS AMPLIF: CPT | Performed by: EMERGENCY MEDICINE

## 2024-12-05 PROCEDURE — 87491 CHLMYD TRACH DNA AMP PROBE: CPT | Performed by: EMERGENCY MEDICINE

## 2024-12-05 PROCEDURE — 81514 NFCT DS BV&VAGINITIS DNA ALG: CPT | Performed by: EMERGENCY MEDICINE

## 2024-12-05 PROCEDURE — 96372 THER/PROPH/DIAG INJ SC/IM: CPT

## 2024-12-05 PROCEDURE — 86780 TREPONEMA PALLIDUM: CPT | Performed by: EMERGENCY MEDICINE

## 2024-12-05 PROCEDURE — 81003 URINALYSIS AUTO W/O SCOPE: CPT | Performed by: EMERGENCY MEDICINE

## 2024-12-05 PROCEDURE — 87563 M. GENITALIUM AMP PROBE: CPT | Performed by: EMERGENCY MEDICINE

## 2024-12-05 PROCEDURE — 99284 EMERGENCY DEPT VISIT MOD MDM: CPT | Performed by: EMERGENCY MEDICINE

## 2024-12-05 PROCEDURE — 87591 N.GONORRHOEAE DNA AMP PROB: CPT | Performed by: EMERGENCY MEDICINE

## 2024-12-05 PROCEDURE — 36415 COLL VENOUS BLD VENIPUNCTURE: CPT | Performed by: EMERGENCY MEDICINE

## 2024-12-05 RX ORDER — DOXYCYCLINE 100 MG/1
100 CAPSULE ORAL 2 TIMES DAILY
Qty: 14 CAPSULE | Refills: 0 | Status: SHIPPED | OUTPATIENT
Start: 2024-12-05 | End: 2024-12-12

## 2024-12-05 RX ORDER — METRONIDAZOLE 500 MG/1
500 TABLET ORAL ONCE
Status: COMPLETED | OUTPATIENT
Start: 2024-12-05 | End: 2024-12-05

## 2024-12-05 RX ORDER — ONDANSETRON 4 MG/1
4 TABLET, ORALLY DISINTEGRATING ORAL EVERY 8 HOURS PRN
Qty: 20 TABLET | Refills: 0 | Status: SHIPPED | OUTPATIENT
Start: 2024-12-05

## 2024-12-05 RX ORDER — METRONIDAZOLE 500 MG/1
500 TABLET ORAL EVERY 12 HOURS SCHEDULED
Qty: 14 TABLET | Refills: 0 | Status: SHIPPED | OUTPATIENT
Start: 2024-12-05 | End: 2024-12-12

## 2024-12-05 RX ORDER — DOXYCYCLINE 100 MG/1
100 CAPSULE ORAL ONCE
Status: COMPLETED | OUTPATIENT
Start: 2024-12-05 | End: 2024-12-05

## 2024-12-05 RX ORDER — LIDOCAINE HYDROCHLORIDE 10 MG/ML
INJECTION, SOLUTION EPIDURAL; INFILTRATION; INTRACAUDAL; PERINEURAL
Status: COMPLETED
Start: 2024-12-05 | End: 2024-12-05

## 2024-12-05 RX ADMIN — METRONIDAZOLE 500 MG: 500 TABLET ORAL at 00:55

## 2024-12-05 RX ADMIN — LIDOCAINE HYDROCHLORIDE 500 MG: 10 INJECTION, SOLUTION EPIDURAL; INFILTRATION; INTRACAUDAL; PERINEURAL at 01:01

## 2024-12-05 RX ADMIN — LIDOCAINE HYDROCHLORIDE: 10 INJECTION, SOLUTION EPIDURAL; INFILTRATION; INTRACAUDAL; PERINEURAL at 01:02

## 2024-12-05 RX ADMIN — DOXYCYCLINE 100 MG: 100 CAPSULE ORAL at 00:55

## 2024-12-05 NOTE — DISCHARGE INSTRUCTIONS
Zofran as needed for nausea  Finish 7 days of doxycyline - wait 2 hours before taking multivitamins calcium dairy magnesium because will stick to the medicine and make it not work  Finish seveb days of metronidazole avoid alcohol because you are liver is busy processing this medicine if you drink alcohol can make you throw up  Return with fever worsening or change in abdominal pain throwing up  No sexual intercourse until you have completed the antibiotics  You are being treated for gonorrhea chlamydia trichomonas and bacterial vaginosis if you require additional treatment for any additional pending tests we will contact you

## 2024-12-05 NOTE — ED PROVIDER NOTES
Time reflects when diagnosis was documented in both MDM as applicable and the Disposition within this note       Time User Action Codes Description Comment    12/5/2024 12:48 AM Brianna Glover [R30.0] Dysuria     12/5/2024 12:48 AM Brianna Glover [N89.8] Vaginal discharge           ED Disposition       ED Disposition   Discharge    Condition   Stable    Date/Time   u Dec 5, 2024  1:01 AM    Comment   Delmi Samlen discharge to home/self care.                   Assessment & Plan       Medical Decision Making  24-year-old female with dysuria and suprapubic cramping with worsening vaginal discharge pelvic exam does not reveal any adnexal or cervical motion tenderness and is not consistent with PID.  Will check UA for UTI otherwise treat for urethritis for presumed GC or chlamydia also wants to be checked for other sexually transmitted infections will assess for syphilis HIV mycoplasma genitalium trichomonas chlamydia and bacterial vaginosis.    Viewed labs secondary to symptomatic with completely normal urine and higher index of suspicion for chlamydia patient be treated with Rocephin/doxycycline will also add metronidazole to cover for the vaginal discharge as this may represent bacterial vaginosis and/or trichomonas.  She was updated that her HIV tests are negative.  Should her other pending labs positive she will be contacted for additional treatment recommendations recommend [facility]ynn follow-up she was cautioned to abstain from sexual intercourse until she completes her treatment course return precautions were reviewed.  Is comfortable with plan    Amount and/or Complexity of Data Reviewed  Labs: ordered.    Risk  Prescription drug management.             Medications   cefTRIAXone (ROCEPHIN) 500 mg in lidocaine (PF) (XYLOCAINE-MPF) 1 % IM only syringe (500 mg Intramuscular Given 12/5/24 0101)   doxycycline hyclate (VIBRAMYCIN) capsule 100 mg (100 mg Oral Given 12/5/24 0055)   metroNIDAZOLE (FLAGYL)  "tablet 500 mg (500 mg Oral Given 12/5/24 0055)   lidocaine (PF) (XYLOCAINE-MPF) 1 % injection **ADS Override Pull** (  Given 12/5/24 0102)       ED Risk Strat Scores                           SBIRT 20yo+      Flowsheet Row Most Recent Value   Initial Alcohol Screen: US AUDIT-C     1. How often do you have a drink containing alcohol? 0 Filed at: 12/04/2024 2332   2. How many drinks containing alcohol do you have on a typical day you are drinking?  0 Filed at: 12/04/2024 2332   3b. FEMALE Any Age, or MALE 65+: How often do you have 4 or more drinks on one occassion? 0 Filed at: 12/04/2024 2332   Audit-C Score 0 Filed at: 12/04/2024 2332   NOAM: How many times in the past year have you...    Used an illegal drug or used a prescription medication for non-medical reasons? Never Filed at: 12/04/2024 2332                            History of Present Illness       Chief Complaint   Patient presents with    Abdominal Cramping     For the last few days   states she has a \"funny feeling\" when she voids    loss of appetite   stopped the depo shot in February   Home pregnancy negative       History reviewed. No pertinent past medical history.   Past Surgical History:   Procedure Laterality Date    ABDOMINAL SURGERY      tummy tuck    DILATION AND EVACUATION N/A 6/20/2024    Procedure: (D&E) (7 WEEKS), psb hysteroscopy, eua;  Surgeon: Eliane Gardner MD;  Location: [facility] OR;  Service: Gynecology      History reviewed. No pertinent family history.   Social History     Tobacco Use    Smoking status: Never    Smokeless tobacco: Never   Vaping Use    Vaping status: Some Days    Substances: Nicotine, THC, Flavoring   Substance Use Topics    Alcohol use: Yes     Comment: social    Drug use: Yes     Types: Marijuana     Comment: used 6/19      E-Cigarette/Vaping    E-Cigarette Use Current Some Day User       E-Cigarette/Vaping Substances    Nicotine Yes     THC Yes     CBD No     Flavoring Yes     Other No     Unknown No       I " have reviewed and agree with the history as documented.     24-year-old female -1-1-1 presents with dysuria and increased urinary frequency accompanied by suprapubic cramping she is denying any flank pain she has had no nausea vomiting or diarrhea.  She has had prior UTIs but none recently.  Her appetite has been diminished she denies any fever chills cough or upper respiratory complaints no chest pain or shortness of breath no other abdominal pain no falls or trauma she is concerned about the possibility of an STI she has had an increased milky whitish discharge denies vaginal bleeding last menstrual period 2024  Past medical history UTIs D&C        Review of Systems   Constitutional:  Positive for appetite change. Negative for activity change, chills, fatigue and fever.   HENT:  Negative for congestion, ear pain, rhinorrhea, sneezing and sore throat.    Eyes:  Negative for discharge.   Respiratory:  Negative for cough and shortness of breath.    Cardiovascular:  Negative for chest pain and leg swelling.   Gastrointestinal:  Positive for abdominal pain (cramping suprapubic region). Negative for blood in stool, diarrhea, nausea and vomiting.   Endocrine: Negative for polyuria.   Genitourinary:  Positive for decreased urine volume, dysuria, frequency and vaginal discharge. Negative for difficulty urinating, flank pain, pelvic pain, urgency, vaginal bleeding and vaginal pain.   Musculoskeletal:  Negative for back pain and myalgias.   Skin:  Negative for rash.   Neurological:  Negative for dizziness, weakness, light-headedness, numbness and headaches.   Hematological:  Negative for adenopathy.   Psychiatric/Behavioral:  Negative for confusion.    All other systems reviewed and are negative.          Objective       ED Triage Vitals [24 2330]   Temperature Pulse Blood Pressure Respirations SpO2 Patient Position - Orthostatic VS   98.2 °F (36.8 °C) 78 124/74 18 99 % Sitting      Temp Source Heart  Rate Source BP Location FiO2 (%) Pain Score    Temporal Monitor Left arm -- 4      Vitals      Date and Time Temp Pulse SpO2 Resp BP Pain Score FACES Pain Rating User   12/04/24 2330 98.2 °F (36.8 °C) 78 99 % 18 124/74 4 -- EZ            Physical Exam  Vitals and nursing note reviewed. Exam conducted with a chaperone present.   Constitutional:       General: She is not in acute distress.     Appearance: She is not ill-appearing, toxic-appearing or diaphoretic.   HENT:      Right Ear: Tympanic membrane normal. There is no impacted cerumen.      Left Ear: Tympanic membrane normal. There is no impacted cerumen.      Nose: Rhinorrhea present. No congestion.      Mouth/Throat:      Mouth: Mucous membranes are moist.      Pharynx: No oropharyngeal exudate or posterior oropharyngeal erythema.   Eyes:      General:         Right eye: No discharge.         Left eye: No discharge.      Extraocular Movements: Extraocular movements intact.      Conjunctiva/sclera: Conjunctivae normal.      Pupils: Pupils are equal, round, and reactive to light.   Cardiovascular:      Rate and Rhythm: Normal rate and regular rhythm.      Pulses: Normal pulses.   Pulmonary:      Effort: Pulmonary effort is normal. No respiratory distress.      Breath sounds: Normal breath sounds. No wheezing or rales.   Chest:      Chest wall: No tenderness.   Abdominal:      General: Bowel sounds are normal. There is no distension.      Palpations: Abdomen is soft.      Tenderness: There is abdominal tenderness (Suprapubic). There is no right CVA tenderness, guarding or rebound.   Genitourinary:     Comments: You exam chaperoned by Parvin BRICENO normal external genital genitalia there is no lesions evident patient does have a thick whitish discharge within the vaginal vault.  Patient has no adnexal tenderness or masses bilaterally no cervical motion tenderness  Musculoskeletal:         General: Normal range of motion.      Cervical back: Normal range of motion and  neck supple.      Right lower leg: No edema.      Left lower leg: No edema.   Skin:     General: Skin is warm and dry.      Capillary Refill: Capillary refill takes less than 2 seconds.      Findings: No rash.   Neurological:      General: No focal deficit present.      Mental Status: She is alert and oriented to person, place, and time.      Cranial Nerves: No cranial nerve deficit.      Sensory: No sensory deficit.      Motor: No weakness.      Coordination: Coordination normal.   Psychiatric:         Mood and Affect: Mood normal.         Results Reviewed       Procedure Component Value Units Date/Time    RAPID HIV 1/2 AB-AG COMBO for 12 years old and above [[id number]]  (Normal) Collected: 12/05/24 0009    Lab Status: Final result Specimen: Blood from Arm, Right Updated: 12/05/24 0048     Rapid HIV 1 AND 2 Non-Reactive     HIV-1 P24 Ag Screen Non-Reactive    Narrative:      Negative for HIV-1 p24 Antigen.  Negative for HIV-1 and/or HIV-2 Antibody.    UA w Reflex to Microscopic w Reflex to Culture [[id number]] Collected: 12/05/24 0004    Lab Status: Final result Specimen: Urine, Clean Catch Updated: 12/05/24 0038     Color, UA Light Yellow     Clarity, UA Clear     Specific Gravity, UA 1.015     pH, UA 6.0     Leukocytes, UA Negative     Nitrite, UA Negative     Protein, UA Negative mg/dl      Glucose, UA Negative mg/dl      Ketones, UA Negative mg/dl      Urobilinogen, UA <2.0 mg/dl      Bilirubin, UA Negative     Occult Blood, UA Negative    Chlamydia/GC amplified DNA by PCR [[id number]] Collected: 12/05/24 0004    Lab Status: In process Specimen: Urine, Other Updated: 12/05/24 0016    RPR-Syphilis Screening (Total Syphilis IGG/IGM) [[id number]] Collected: 12/05/24 0009    Lab Status: In process Specimen: Blood from Arm, Right Updated: 12/05/24 0015    Trichomonas vaginalis/Mycoplasma genitalium PCR [[id number]] Collected: 12/05/24 0003    Lab Status: In process Specimen: Vaginal Updated: 12/05/24 0015    Molecular  Vaginal Panel [[id number]] Collected: 12/05/24 0004    Lab Status: In process Specimen: Genital from Vaginal Updated: 12/05/24 0015    POCT pregnancy, urine [[id number]]  (Normal) Collected: 12/05/24 0006    Lab Status: Final result Updated: 12/05/24 0006     EXT Preg Test, Ur Negative     Control Valid            No orders to display       Procedures    ED Medication and Procedure Management   Prior to Admission Medications   Prescriptions Last Dose Informant Patient Reported? Taking?   acetaminophen (TYLENOL) 650 mg CR tablet   No No   Sig: Take 1 tablet (650 mg total) by mouth every 8 (eight) hours as needed for mild pain   ibuprofen (MOTRIN) 600 mg tablet   No No   Sig: Take 1 tablet (600 mg total) by mouth every 6 (six) hours as needed for mild pain   ondansetron (ZOFRAN-ODT) 4 mg disintegrating tablet   No No   Sig: Take 1 tablet (4 mg total) by mouth every 8 (eight) hours as needed for nausea or vomiting for up to 20 doses   Patient not taking: Reported on 6/12/2024      Facility-Administered Medications: None     Discharge Medication List as of 12/5/2024  1:02 AM        START taking these medications    Details   doxycycline hyclate (VIBRAMYCIN) 100 mg capsule Take 1 capsule (100 mg total) by mouth 2 (two) times a day for 7 days, Starting u 12/5/2024, Until u 12/12/2024, Normal      metroNIDAZOLE (FLAGYL) 500 mg tablet Take 1 tablet (500 mg total) by mouth every 12 (twelve) hours for 7 days, Starting u 12/5/2024, Until u 12/12/2024, Normal      !! ondansetron (ZOFRAN-ODT) 4 mg disintegrating tablet Take 1 tablet (4 mg total) by mouth every 8 (eight) hours as needed for nausea or vomiting for up to 20 doses, Starting u 12/5/2024, Normal       !! - Potential duplicate medications found. Please discuss with provider.        CONTINUE these medications which have NOT CHANGED    Details   acetaminophen (TYLENOL) 650 mg CR tablet Take 1 tablet (650 mg total) by mouth every 8 (eight) hours as needed for mild  pain, Starting Wed 6/19/2024, Normal      ibuprofen (MOTRIN) 600 mg tablet Take 1 tablet (600 mg total) by mouth every 6 (six) hours as needed for mild pain, Starting Thu 6/20/2024, Normal      !! ondansetron (ZOFRAN-ODT) 4 mg disintegrating tablet Take 1 tablet (4 mg total) by mouth every 8 (eight) hours as needed for nausea or vomiting for up to 20 doses, Starting Fri 1/5/2024, Normal       !! - Potential duplicate medications found. Please discuss with provider.        No discharge procedures on file.  ED SEPSIS DOCUMENTATION   Time reflects when diagnosis was documented in both MDM as applicable and the Disposition within this note       Time User Action Codes Description Comment    12/5/2024 12:48 AM Brianna Glover [R30.0] Dysuria     12/5/2024 12:48 AM Brianna Glover [N89.8] Vaginal discharge                  Brianna Glover MD  12/05/24 0107

## 2024-12-06 ENCOUNTER — RESULTS FOLLOW-UP (OUTPATIENT)
Dept: EMERGENCY DEPT | Facility: HOSPITAL | Age: 24
End: 2024-12-06

## 2025-01-08 ENCOUNTER — HOSPITAL ENCOUNTER (EMERGENCY)
Facility: HOSPITAL | Age: 25
Discharge: HOME | End: 2025-01-09
Payer: COMMERCIAL

## 2025-01-08 ENCOUNTER — APPOINTMENT (OUTPATIENT)
Dept: RADIOLOGY | Facility: HOSPITAL | Age: 25
End: 2025-01-08
Payer: COMMERCIAL

## 2025-01-08 DIAGNOSIS — J06.9 VIRAL URI WITH COUGH: Primary | ICD-10-CM

## 2025-01-08 LAB
ALBUMIN SERPL BCG-MCNC: 3.8 G/DL (ref 3.5–5)
ALP SERPL-CCNC: 67 U/L (ref 34–104)
ALT SERPL W P-5'-P-CCNC: 20 U/L (ref 7–52)
ANION GAP SERPL CALCULATED.3IONS-SCNC: 10 MMOL/L (ref 4–13)
AST SERPL W P-5'-P-CCNC: 22 U/L (ref 13–39)
ATRIAL RATE: 86 BPM
BASOPHILS # BLD AUTO: 0.05 THOUSANDS/ΜL (ref 0–0.1)
BASOPHILS NFR BLD AUTO: 1 % (ref 0–1)
BILIRUB SERPL-MCNC: 0.24 MG/DL (ref 0.2–1)
BUN SERPL-MCNC: 8 MG/DL (ref 5–25)
CALCIUM SERPL-MCNC: 9.2 MG/DL (ref 8.4–10.2)
CARDIAC TROPONIN I PNL SERPL HS: <2 NG/L (ref ?–50)
CHLORIDE SERPL-SCNC: 106 MMOL/L (ref 96–108)
CO2 SERPL-SCNC: 23 MMOL/L (ref 21–32)
CREAT SERPL-MCNC: 0.78 MG/DL (ref 0.6–1.3)
EOSINOPHIL # BLD AUTO: 0.18 THOUSAND/ΜL (ref 0–0.61)
EOSINOPHIL NFR BLD AUTO: 3 % (ref 0–6)
ERYTHROCYTE [DISTWIDTH] IN BLOOD BY AUTOMATED COUNT: 13.4 % (ref 11.6–15.1)
FLUAV AG UPPER RESP QL IA.RAPID: NEGATIVE
FLUBV AG UPPER RESP QL IA.RAPID: NEGATIVE
GFR SERPL CREATININE-BSD FRML MDRD: 105 ML/MIN/1.73SQ M
GLUCOSE SERPL-MCNC: 111 MG/DL (ref 65–140)
HCT VFR BLD AUTO: 37.9 % (ref 34.8–46.1)
HGB BLD-MCNC: 12.3 G/DL (ref 11.5–15.4)
IMM GRANULOCYTES # BLD AUTO: 0.01 THOUSAND/UL (ref 0–0.2)
IMM GRANULOCYTES NFR BLD AUTO: 0 % (ref 0–2)
LYMPHOCYTES # BLD AUTO: 1.09 THOUSANDS/ΜL (ref 0.6–4.47)
LYMPHOCYTES NFR BLD AUTO: 19 % (ref 14–44)
MCH RBC QN AUTO: 27.9 PG (ref 26.8–34.3)
MCHC RBC AUTO-ENTMCNC: 32.5 G/DL (ref 31.4–37.4)
MCV RBC AUTO: 86 FL (ref 82–98)
MONOCYTES # BLD AUTO: 0.3 THOUSAND/ΜL (ref 0.17–1.22)
MONOCYTES NFR BLD AUTO: 5 % (ref 4–12)
NEUTROPHILS # BLD AUTO: 4.05 THOUSANDS/ΜL (ref 1.85–7.62)
NEUTS SEG NFR BLD AUTO: 72 % (ref 43–75)
NRBC BLD AUTO-RTO: 0 /100 WBCS
P AXIS: 54 DEGREES
PLATELET # BLD AUTO: 283 THOUSANDS/UL (ref 149–390)
PMV BLD AUTO: 10.7 FL (ref 8.9–12.7)
POTASSIUM SERPL-SCNC: 3.6 MMOL/L (ref 3.5–5.3)
PR INTERVAL: 150 MS
PROT SERPL-MCNC: 7 G/DL (ref 6.4–8.4)
QRS AXIS: 78 DEGREES
QRSD INTERVAL: 82 MS
QT INTERVAL: 360 MS
QTC INTERVAL: 430 MS
RBC # BLD AUTO: 4.41 MILLION/UL (ref 3.81–5.12)
SARS-COV+SARS-COV-2 AG RESP QL IA.RAPID: NEGATIVE
SODIUM SERPL-SCNC: 139 MMOL/L (ref 135–147)
T WAVE AXIS: 62 DEGREES
VENTRICULAR RATE: 86 BPM
WBC # BLD AUTO: 5.68 THOUSAND/UL (ref 4.31–10.16)

## 2025-01-08 PROCEDURE — 87811 SARS-COV-2 COVID19 W/OPTIC: CPT

## 2025-01-08 PROCEDURE — 80053 COMPREHEN METABOLIC PANEL: CPT

## 2025-01-08 PROCEDURE — 99285 EMERGENCY DEPT VISIT HI MDM: CPT

## 2025-01-08 PROCEDURE — 99284 EMERGENCY DEPT VISIT MOD MDM: CPT

## 2025-01-08 PROCEDURE — 71045 X-RAY EXAM CHEST 1 VIEW: CPT

## 2025-01-08 PROCEDURE — 85025 COMPLETE CBC W/AUTO DIFF WBC: CPT

## 2025-01-08 PROCEDURE — 36415 COLL VENOUS BLD VENIPUNCTURE: CPT

## 2025-01-08 PROCEDURE — 93005 ELECTROCARDIOGRAM TRACING: CPT

## 2025-01-08 PROCEDURE — 87804 INFLUENZA ASSAY W/OPTIC: CPT

## 2025-01-08 PROCEDURE — 84484 ASSAY OF TROPONIN QUANT: CPT

## 2025-01-08 RX ORDER — KETOROLAC TROMETHAMINE 30 MG/ML
15 INJECTION, SOLUTION INTRAMUSCULAR; INTRAVENOUS ONCE
Status: COMPLETED | OUTPATIENT
Start: 2025-01-08 | End: 2025-01-09

## 2025-01-08 RX ORDER — DEXAMETHASONE 4 MG/1
6 TABLET ORAL ONCE
Status: COMPLETED | OUTPATIENT
Start: 2025-01-08 | End: 2025-01-09

## 2025-01-08 NOTE — Clinical Note
Delmi Tejeda was seen and treated in our emergency department on 1/8/2025.                Diagnosis:     Delmi  is off the rest of the shift today.    She may return on this date: 01/10/2025         If you have any questions or concerns, please don't hesitate to call.      Carson Banks MD    ______________________________           _______________          _______________  Hospital Representative                              Date                                Time

## 2025-01-09 VITALS
OXYGEN SATURATION: 100 % | RESPIRATION RATE: 19 BRPM | HEART RATE: 81 BPM | TEMPERATURE: 98.4 F | SYSTOLIC BLOOD PRESSURE: 113 MMHG | DIASTOLIC BLOOD PRESSURE: 68 MMHG

## 2025-01-09 PROCEDURE — 96374 THER/PROPH/DIAG INJ IV PUSH: CPT

## 2025-01-09 RX ORDER — BENZONATATE 100 MG/1
100 CAPSULE ORAL EVERY 8 HOURS
Qty: 21 CAPSULE | Refills: 0 | Status: SHIPPED | OUTPATIENT
Start: 2025-01-09

## 2025-01-09 RX ADMIN — DEXAMETHASONE 6 MG: 4 TABLET ORAL at 00:18

## 2025-01-09 RX ADMIN — KETOROLAC TROMETHAMINE 15 MG: 30 INJECTION, SOLUTION INTRAMUSCULAR at 00:17

## 2025-01-09 NOTE — DISCHARGE INSTRUCTIONS
Please see your primary doctor for follow-up in the next few days.  If you are feeling very much more short of breath thank back to the emergency department immediately.

## 2025-01-09 NOTE — ED PROVIDER NOTES
Time reflects when diagnosis was documented in both MDM as applicable and the Disposition within this note       Time User Action Codes Description Comment    1/9/2025 12:06 AM Carson Banks Add [J06.9] Viral URI with cough           ED Disposition       ED Disposition   Discharge    Condition   Stable    Date/Time   Thu Jan 9, 2025 12:06 AM    Comment   Delmi Tejeda discharge to home/self care.                   Assessment & Plan       Medical Decision Making  Medical complexity: 24-year-old female is presenting with cough, congestion, and flulike symptoms.  This has been going on for about a week now.  She is now experiencing a feeling of chest congestion as well as some tightness across her chest whenever she has a coughing spell.  She is requesting medications to help with her cough as well as with her pharyngitis.  Will prescribe Tessalon Perles to see if this can help her cough.  Will screen x-ray for signs of pneumonia.  Will screen blood work for signs of anemia, leukocytosis or left shift, or acute metabolic disturbances due to ongoing flulike symptoms.  Given the chest pain, will screen serum troponin biomarker however I have a lower suspicion of ACS given the patient's age and lack of other risk factors.    Reassessment/disposition: Thankfully the patient does not have any sign of pneumonia on her chest x-ray, and her CBC does not demonstrate any abnormality of concern either.  Her viral panel came back with a negative result.  Her metabolic panel was essentially within normal limits.  At this time, patient will be discharged was instructed to continue to use supportive care measures at home.  She was given a dose of Decadron for her pharyngitis.  Patient will continue to use Tylenol and Motrin as necessary for fevers and chills, will use over-the-counter medications to help alleviate her sore throat or other congestion symptoms.  Patient was discharged with no acute distress, with normal range vital  signs, with no sign of respiratory compromise, and in no acute distress.    Amount and/or Complexity of Data Reviewed  Labs: ordered.  Radiology: ordered and independent interpretation performed.    Risk  Prescription drug management.        ED Course as of 01/09/25 0549   Thu Jan 09, 2025   0349 ECG interpreted by myself.  Date: 1/8/2025.  Time: 2/2/2004.  Rate: 86 bpm.  Axis: Normal.  Rhythm: Regular.  No ST elevations or depressions evident on this ECG.  Interpretation: Nonspecific repolarization abnormalities, otherwise normal ECG.       Medications   dexamethasone (DECADRON) tablet 6 mg (6 mg Oral Given 1/9/25 0018)   ketorolac (TORADOL) injection 15 mg (15 mg Intravenous Given 1/9/25 0017)       ED Risk Strat Scores   HEART Risk Score      Flowsheet Row Most Recent Value   Heart Score Risk Calculator    History 0 Filed at: 01/09/2025 0548   ECG 1 Filed at: 01/09/2025 0548   Age 0 Filed at: 01/09/2025 0548   Risk Factors 0 Filed at: 01/09/2025 0548   Troponin 0 Filed at: 01/09/2025 0548   HEART Score 1 Filed at: 01/09/2025 0548          HEART Risk Score      Flowsheet Row Most Recent Value   Heart Score Risk Calculator    History 0 Filed at: 01/09/2025 0548   ECG 1 Filed at: 01/09/2025 0548   Age 0 Filed at: 01/09/2025 0548   Risk Factors 0 Filed at: 01/09/2025 0548   Troponin 0 Filed at: 01/09/2025 0548   HEART Score 1 Filed at: 01/09/2025 0548                            SBIRT 20yo+      Flowsheet Row Most Recent Value   Initial Alcohol Screen: US AUDIT-C     1. How often do you have a drink containing alcohol? 0 Filed at: 01/08/2025 2121   2. How many drinks containing alcohol do you have on a typical day you are drinking?  0 Filed at: 01/08/2025 2121   3a. Male UNDER 65: How often do you have five or more drinks on one occasion? 0 Filed at: 01/08/2025 2121   3b. FEMALE Any Age, or MALE 65+: How often do you have 4 or more drinks on one occassion? 0 Filed at: 01/08/2025 2121   Audit-C Score 0 Filed at:  01/08/2025 2121   NOAM: How many times in the past year have you...    Used an illegal drug or used a prescription medication for non-medical reasons? Never Filed at: 01/08/2025 2121                            History of Present Illness       Chief Complaint   Patient presents with    Cough     Pt states that she has had a cough for about a week and is getting chest pain when she coughs and short of breath with it. Unknown fevers at home. Denies coughing anything up       History reviewed. No pertinent past medical history.   Past Surgical History:   Procedure Laterality Date    ABDOMINAL SURGERY      tummy tuck    DILATION AND EVACUATION N/A 6/20/2024    Procedure: (D&E) (7 WEEKS), psb hysteroscopy, eua;  Surgeon: Eliane Gardner MD;  Location: [facility] OR;  Service: Gynecology      History reviewed. No pertinent family history.   Social History     Tobacco Use    Smoking status: Never    Smokeless tobacco: Never   Vaping Use    Vaping status: Some Days    Substances: Nicotine, THC, Flavoring   Substance Use Topics    Alcohol use: Yes     Comment: social    Drug use: Yes     Types: Marijuana     Comment: used 6/19      E-Cigarette/Vaping    E-Cigarette Use Current Some Day User       E-Cigarette/Vaping Substances    Nicotine Yes     THC Yes     CBD No     Flavoring Yes     Other No     Unknown No       I have reviewed and agree with the history as documented.     This is a 24-year-old female who has a known history of cigarette smoking who is presenting today with concern for cough, congestion, occasional chest pains with some shortness of breath.  Patient reports flulike symptoms for several days.  She has not had intermittent fevers but is unsure of a Tmax.  She denies any known history of cardiopulmonary disease.  She denies any recent leg swelling.  She denies any estrogen supplementation therapy, and does not take any blood thinners for any reason.  She has no history of blood clot, recent surgeries or  immobilizations.        Review of Systems   Constitutional:  Positive for chills, fatigue and fever.   HENT:  Positive for sore throat. Negative for ear pain.    Eyes:  Negative for pain and visual disturbance.   Respiratory:  Positive for cough, chest tightness and shortness of breath.    Cardiovascular:  Positive for chest pain. Negative for palpitations.   Gastrointestinal:  Positive for nausea. Negative for abdominal pain and vomiting.   Genitourinary:  Negative for dysuria and hematuria.   Musculoskeletal:  Positive for arthralgias. Negative for back pain.   Skin:  Negative for color change and rash.   Neurological:  Positive for weakness. Negative for seizures and syncope.   All other systems reviewed and are negative.          Objective       ED Triage Vitals [01/08/25 2119]   Temperature Pulse Blood Pressure Respirations SpO2 Patient Position - Orthostatic VS   98.4 °F (36.9 °C) 96 140/64 18 100 % Lying      Temp Source Heart Rate Source BP Location FiO2 (%) Pain Score    Temporal Monitor Right arm -- No Pain      Vitals      Date and Time Temp Pulse SpO2 Resp BP Pain Score FACES Pain Rating User   01/09/25 0017 -- -- -- -- -- 3 -- CP   01/09/25 0000 98.4 °F (36.9 °C) 81 100 % 19 113/68 3 -- CP   01/08/25 2300 98.7 °F (37.1 °C) 78 100 % 18 102/58 No Pain -- CP   01/08/25 2119 98.4 °F (36.9 °C) 96 100 % 18 140/64 No Pain -- AB            Physical Exam  Vitals and nursing note reviewed.   Constitutional:       General: She is not in acute distress.     Appearance: She is well-developed and normal weight.   HENT:      Head: Normocephalic and atraumatic.      Right Ear: External ear normal.      Left Ear: External ear normal.      Nose: Congestion present. No rhinorrhea.      Mouth/Throat:      Mouth: Mucous membranes are moist.      Pharynx: Oropharynx is clear. Posterior oropharyngeal erythema present. No oropharyngeal exudate.   Eyes:      General: No scleral icterus.     Extraocular Movements: Extraocular  movements intact.      Conjunctiva/sclera: Conjunctivae normal.      Pupils: Pupils are equal, round, and reactive to light.   Cardiovascular:      Rate and Rhythm: Normal rate and regular rhythm.      Pulses: Normal pulses.      Heart sounds: Normal heart sounds. No murmur heard.  Pulmonary:      Effort: Pulmonary effort is normal. No respiratory distress.      Breath sounds: Normal breath sounds. No wheezing or rhonchi.   Abdominal:      General: Abdomen is flat. There is no distension.      Palpations: Abdomen is soft.      Tenderness: There is no abdominal tenderness. There is no guarding.   Musculoskeletal:         General: No swelling or tenderness.      Cervical back: Neck supple. No rigidity.      Right lower leg: No edema.      Left lower leg: No edema.   Lymphadenopathy:      Cervical: No cervical adenopathy.   Skin:     General: Skin is warm and dry.      Capillary Refill: Capillary refill takes less than 2 seconds.      Coloration: Skin is not jaundiced.      Findings: No rash.   Neurological:      General: No focal deficit present.      Mental Status: She is alert and oriented to person, place, and time. Mental status is at baseline.   Psychiatric:         Mood and Affect: Mood normal.         Behavior: Behavior normal.         Results Reviewed       Procedure Component Value Units Date/Time    CBC and differential [[id number]] Collected: 01/08/25 2148    Lab Status: Final result Specimen: Blood from Arm, Right Updated: 01/08/25 2347     WBC 5.68 Thousand/uL      RBC 4.41 Million/uL      Hemoglobin 12.3 g/dL      Hematocrit 37.9 %      MCV 86 fL      MCH 27.9 pg      MCHC 32.5 g/dL      RDW 13.4 %      MPV 10.7 fL      Platelets 283 Thousands/uL      nRBC 0 /100 WBCs      Segmented % 72 %      Immature Grans % 0 %      Lymphocytes % 19 %      Monocytes % 5 %      Eosinophils Relative 3 %      Basophils Relative 1 %      Absolute Neutrophils 4.05 Thousands/µL      Absolute Immature Grans 0.01 Thousand/uL       Absolute Lymphocytes 1.09 Thousands/µL      Absolute Monocytes 0.30 Thousand/µL      Eosinophils Absolute 0.18 Thousand/µL      Basophils Absolute 0.05 Thousands/µL     HS Troponin 0hr (reflex protocol) [[id number]]  (Normal) Collected: 01/08/25 2148    Lab Status: Final result Specimen: Blood from Arm, Right Updated: 01/08/25 2222     hs TnI 0hr <2 ng/L     Comprehensive metabolic panel [[id number]] Collected: 01/08/25 2148    Lab Status: Final result Specimen: Blood from Arm, Right Updated: 01/08/25 2216     Sodium 139 mmol/L      Potassium 3.6 mmol/L      Chloride 106 mmol/L      CO2 23 mmol/L      ANION GAP 10 mmol/L      BUN 8 mg/dL      Creatinine 0.78 mg/dL      Glucose 111 mg/dL      Calcium 9.2 mg/dL      AST 22 U/L      ALT 20 U/L      Alkaline Phosphatase 67 U/L      Total Protein 7.0 g/dL      Albumin 3.8 g/dL      Total Bilirubin 0.24 mg/dL      eGFR 105 ml/min/1.73sq m     Narrative:      National Kid[employer] guidelines for Chronic Kidney Disease (CKD):     Stage 1 with normal or high GFR (GFR > 90 mL/min/1.73 square meters)    Stage 2 Mild CKD (GFR = 60-89 mL/min/1.73 square meters)    Stage 3A Moderate CKD (GFR = 45-59 mL/min/1.73 square meters)    Stage 3B Moderate CKD (GFR = 30-44 mL/min/1.73 square meters)    Stage 4 Severe CKD (GFR = 15-29 mL/min/1.73 square meters)    Stage 5 End Stage CKD (GFR <15 mL/min/1.73 square meters)  Note: GFR calculation is accurate only with a steady state creatinine    FLU/COVID Rapid Antigen (30 min. TAT) - Preferred screening test in ED [[id number]]  (Normal) Collected: 01/08/25 2148    Lab Status: Final result Specimen: Nares from Nose Updated: 01/08/25 2214     SARS COV Rapid Antigen Negative     Influenza A Rapid Antigen Negative     Influenza B Rapid Antigen Negative    Narrative:      This test has been performed using the Quidel Keila 2 FLU+SARS Antigen test under the Emergency Use Authorization (EUA). This test has been validated by the   and verified by the performing laboratory. The Keila uses lateral flow immunofluorescent sandwich assay to detect SARS-COV, Influenza A and Influenza B Antigen.     The Quidel Keila 2 SARS Antigen test does not differentiate between SARS-CoV and SARS-CoV-2.     Negative results are presumptive and may be confirmed with a molecular assay, if necessary, for patient management. Negative results do not rule out SARS-CoV-2 or influenza infection and should not be used as the sole basis for treatment or patient management decisions. A negative test result may occur if the level of antigen in a sample is below the limit of detection of this test.     Positive results are indicative of the presence of viral antigens, but do not rule out bacterial infection or co-infection with other viruses.     All test results should be used as an adjunct to clinical observations and other information available to the provider.    FOR PEDIATRIC PATIENTS - copy/paste COVID Guidelines URL to browser: [email or web address]            XR chest 1 view portable   ED Interpretation by Carson Banks MD (01/09 0004)   No acute intrathoracic pathology identified          Procedures    ED Medication and Procedure Management   Prior to Admission Medications   Prescriptions Last Dose Informant Patient Reported? Taking?   acetaminophen (TYLENOL) 650 mg CR tablet   No No   Sig: Take 1 tablet (650 mg total) by mouth every 8 (eight) hours as needed for mild pain   ibuprofen (MOTRIN) 600 mg tablet   No No   Sig: Take 1 tablet (600 mg total) by mouth every 6 (six) hours as needed for mild pain   ondansetron (ZOFRAN-ODT) 4 mg disintegrating tablet   No No   Sig: Take 1 tablet (4 mg total) by mouth every 8 (eight) hours as needed for nausea or vomiting for up to 20 doses   Patient not taking: Reported on 6/12/2024   ondansetron (ZOFRAN-ODT) 4 mg disintegrating tablet   No No   Sig: Take 1  tablet (4 mg total) by mouth every 8 (eight) hours as needed for nausea or vomiting for up to 20 doses      Facility-Administered Medications: None     Discharge Medication List as of 1/9/2025 12:07 AM        START taking these medications    Details   benzonatate (TESSALON PERLES) 100 mg capsule Take 1 capsule (100 mg total) by mouth every 8 (eight) hours, Starting Thu 1/9/2025, Normal           CONTINUE these medications which have NOT CHANGED    Details   acetaminophen (TYLENOL) 650 mg CR tablet Take 1 tablet (650 mg total) by mouth every 8 (eight) hours as needed for mild pain, Starting Wed 6/19/2024, Normal      ibuprofen (MOTRIN) 600 mg tablet Take 1 tablet (600 mg total) by mouth every 6 (six) hours as needed for mild pain, Starting Thu 6/20/2024, Normal      !! ondansetron (ZOFRAN-ODT) 4 mg disintegrating tablet Take 1 tablet (4 mg total) by mouth every 8 (eight) hours as needed for nausea or vomiting for up to 20 doses, Starting Fri 1/5/2024, Normal      !! ondansetron (ZOFRAN-ODT) 4 mg disintegrating tablet Take 1 tablet (4 mg total) by mouth every 8 (eight) hours as needed for nausea or vomiting for up to 20 doses, Starting Thu 12/5/2024, Normal       !! - Potential duplicate medications found. Please discuss with provider.        No discharge procedures on file.  ED SEPSIS DOCUMENTATION   Time reflects when diagnosis was documented in both MDM as applicable and the Disposition within this note       Time User Action Codes Description Comment    1/9/2025 12:06 AM Carson Banks Add [J06.9] Viral URI with cough                  Carson Banks MD  01/09/25 0358       Carson Banks MD  01/09/25 0548       Carson Banks MD  01/09/25 0549

## 2025-01-30 LAB
ATRIAL RATE: 86 BPM
P AXIS: 54 DEGREES
PR INTERVAL: 150 MS
QRS AXIS: 78 DEGREES
QRSD INTERVAL: 82 MS
QT INTERVAL: 360 MS
QTC INTERVAL: 430 MS
T WAVE AXIS: 62 DEGREES
VENTRICULAR RATE: 86 BPM

## 2025-02-01 ENCOUNTER — HOSPITAL ENCOUNTER (EMERGENCY)
Facility: HOSPITAL | Age: 25
Discharge: HOME | End: 2025-02-01
Attending: EMERGENCY MEDICINE | Admitting: EMERGENCY MEDICINE
Payer: COMMERCIAL

## 2025-02-01 VITALS
TEMPERATURE: 97.6 F | SYSTOLIC BLOOD PRESSURE: 124 MMHG | DIASTOLIC BLOOD PRESSURE: 78 MMHG | OXYGEN SATURATION: 98 % | RESPIRATION RATE: 19 BRPM | HEART RATE: 85 BPM

## 2025-02-01 DIAGNOSIS — J02.9 PHARYNGITIS: ICD-10-CM

## 2025-02-01 DIAGNOSIS — R10.13 EPIGASTRIC PAIN: ICD-10-CM

## 2025-02-01 DIAGNOSIS — B34.9 VIRAL SYNDROME: Primary | ICD-10-CM

## 2025-02-01 DIAGNOSIS — K29.70 GASTRITIS: ICD-10-CM

## 2025-02-01 LAB
EXT PREGNANCY TEST URINE: NEGATIVE
EXT. CONTROL: NORMAL
FLUAV AG UPPER RESP QL IA.RAPID: NEGATIVE
FLUBV AG UPPER RESP QL IA.RAPID: NEGATIVE
SARS-COV+SARS-COV-2 AG RESP QL IA.RAPID: NEGATIVE

## 2025-02-01 PROCEDURE — 99284 EMERGENCY DEPT VISIT MOD MDM: CPT | Performed by: EMERGENCY MEDICINE

## 2025-02-01 PROCEDURE — 99283 EMERGENCY DEPT VISIT LOW MDM: CPT

## 2025-02-01 PROCEDURE — 87811 SARS-COV-2 COVID19 W/OPTIC: CPT | Performed by: EMERGENCY MEDICINE

## 2025-02-01 PROCEDURE — 87804 INFLUENZA ASSAY W/OPTIC: CPT | Performed by: EMERGENCY MEDICINE

## 2025-02-01 PROCEDURE — 81025 URINE PREGNANCY TEST: CPT | Performed by: EMERGENCY MEDICINE

## 2025-02-01 RX ORDER — ONDANSETRON 4 MG/1
4 TABLET, ORALLY DISINTEGRATING ORAL EVERY 6 HOURS PRN
Qty: 20 TABLET | Refills: 0 | Status: SHIPPED | OUTPATIENT
Start: 2025-02-01

## 2025-02-01 RX ORDER — SUCRALFATE 1 G/1
1 TABLET ORAL ONCE
Status: COMPLETED | OUTPATIENT
Start: 2025-02-01 | End: 2025-02-01

## 2025-02-01 RX ORDER — MAGNESIUM HYDROXIDE/ALUMINUM HYDROXICE/SIMETHICONE 120; 1200; 1200 MG/30ML; MG/30ML; MG/30ML
30 SUSPENSION ORAL ONCE
Status: COMPLETED | OUTPATIENT
Start: 2025-02-01 | End: 2025-02-01

## 2025-02-01 RX ORDER — ONDANSETRON 4 MG/1
4 TABLET, ORALLY DISINTEGRATING ORAL ONCE
Status: COMPLETED | OUTPATIENT
Start: 2025-02-01 | End: 2025-02-01

## 2025-02-01 RX ORDER — SUCRALFATE 1 G/1
1 TABLET ORAL 4 TIMES DAILY
Qty: 60 TABLET | Refills: 0 | Status: SHIPPED | OUTPATIENT
Start: 2025-02-01

## 2025-02-01 RX ADMIN — ONDANSETRON 4 MG: 4 TABLET, ORALLY DISINTEGRATING ORAL at 00:42

## 2025-02-01 RX ADMIN — ALUMINUM HYDROXIDE, MAGNESIUM HYDROXIDE, AND DIMETHICONE 30 ML: 200; 20; 200 SUSPENSION ORAL at 00:43

## 2025-02-01 RX ADMIN — SUCRALFATE 1 G: 1 TABLET ORAL at 00:42

## 2025-02-01 NOTE — ED PROVIDER NOTES
Time reflects when diagnosis was documented in both MDM as applicable and the Disposition within this note       Time User Action Codes Description Comment    2/1/2025  1:29 AM Carson Lou [B34.9] Viral syndrome     2/1/2025  1:29 AM Carson Lou [J02.9] Pharyngitis     2/1/2025  1:29 AM Carson Lou [R10.13] Epigastric pain     2/1/2025  1:29 AM Carson Lou [K29.70] Gastritis           ED Disposition       ED Disposition   Discharge    Condition   Stable    Date/Time   Sat Feb 1, 2025  1:28 AM    Comment   Delmi Tejeda discharge to home/self care.                   Assessment & Plan       Medical Decision Making  I reviewed the patient's medical chart, PMHx, prior encounters, medications.    My DDx includes: Viral syndrome, covid, influenza, RSV. At this time, given no guarding/rigidity on exam, no RUQ tenderness, and overall presentation consistent with viral syndrome, suspect surgical pathology less likely.    Will obtain covid/flu swab. Will treat symptomatically, reassess.     Patient's viral swab was negative.  She did have improvement after Zofran and GI cocktail.  At this time, will discharge with strict return precautions, prescription for Zofran and Carafate. Patient is to closely monitor her symptoms at home.    Amount and/or Complexity of Data Reviewed  Labs: ordered. Decision-making details documented in ED Course.    Risk  OTC drugs.  Prescription drug management.        ED Course as of 02/01/25 0142   Sat Feb 01, 2025   0047 PREGNANCY TEST URINE: Negative  Negative preg   0112 FLU/COVID Rapid Antigen (30 min. TAT) - Preferred screening test in ED  Negative viral panel.       Medications   ondansetron (ZOFRAN-ODT) dispersible tablet 4 mg (4 mg Oral Given 2/1/25 0042)   sucralfate (CARAFATE) tablet 1 g (1 g Oral Given 2/1/25 0042)   aluminum-magnesium hydroxide-simethicone (MAALOX) oral suspension 30 mL (30 mL Oral Given 2/1/25 0043)       ED Risk  Strat Scores                                              History of Present Illness       Chief Complaint   Patient presents with    Nausea     Per pt, began 3 days ago, w/ sore throat, nausea, fevers, abd pain.       History reviewed. No pertinent past medical history.   Past Surgical History:   Procedure Laterality Date    ABDOMINAL SURGERY      tummy tuck    DILATION AND EVACUATION N/A 6/20/2024    Procedure: (D&E) (7 WEEKS), psb hysteroscopy, eua;  Surgeon: Eliane Gardner MD;  Location: [facility] OR;  Service: Gynecology      History reviewed. No pertinent family history.   Social History     Tobacco Use    Smoking status: Never    Smokeless tobacco: Never   Vaping Use    Vaping status: Some Days    Substances: Nicotine, THC, Flavoring   Substance Use Topics    Alcohol use: Yes     Comment: social    Drug use: Yes     Types: Marijuana     Comment: used 6/19      E-Cigarette/Vaping    E-Cigarette Use Current Some Day User       E-Cigarette/Vaping Substances    Nicotine Yes     THC Yes     CBD No     Flavoring Yes     Other No     Unknown No       I have reviewed and agree with the history as documented.     24-year-old female who presents for [facility] complaints.  She reports for the past several days, she has had dry cough, sore throat, congestion, nausea, upper abdominal discomfort.  She reports that her daughter last week was diagnosed with COVID.  She has no other complaints, she has had subjective fevers and chills however no measured temperatures, no dysuria or frequency.  ROS otherwise negative        Review of Systems   Constitutional:  Positive for chills and fever.   HENT:  Positive for congestion. Negative for rhinorrhea and sore throat.    Respiratory:  Positive for cough. Negative for shortness of breath.    Cardiovascular:  Negative for chest pain and palpitations.   Gastrointestinal:  Positive for abdominal pain and nausea. Negative for constipation and diarrhea.   Genitourinary:  Negative for  difficulty urinating and flank pain.   Musculoskeletal:  Negative for arthralgias.   Neurological:  Negative for dizziness, weakness, light-headedness and headaches.   Psychiatric/Behavioral:  Negative for agitation, behavioral problems and confusion.    All other systems reviewed and are negative.          Objective       ED Triage Vitals [02/01/25 0020]   Temperature Pulse Blood Pressure Respirations SpO2 Patient Position - Orthostatic VS   (!) 96.9 °F (36.1 °C) 88 128/60 20 100 % --      Temp Source Heart Rate Source BP Location FiO2 (%) Pain Score    Tympanic -- -- -- 8      Vitals      Date and Time Temp Pulse SpO2 Resp BP Pain Score FACES Pain Rating User   02/01/25 0139 97.6 °F (36.4 °C) 85 98 % 19 124/78 3 -- CP   02/01/25 0020 96.9 °F (36.1 °C) 88 100 % 20 128/60 8 -- AK            Physical Exam  Vitals and nursing note reviewed.   Constitutional:       Appearance: She is well-developed.      Comments: Well-appearing   HENT:      Head: Normocephalic and atraumatic.      Right Ear: Tympanic membrane normal.      Left Ear: Tympanic membrane normal.      Nose: Nose normal. No congestion or rhinorrhea.      Mouth/Throat:      Mouth: Mucous membranes are moist.      Pharynx: Posterior oropharyngeal erythema present. No oropharyngeal exudate.      Comments: Moist mucous membranes. No tonsillar enlargement, exudate. Mild erythema noted to oropharynx. No evidence of PTA/RPA  Eyes:      General:         Right eye: No discharge.         Left eye: No discharge.      Conjunctiva/sclera: Conjunctivae normal.   Cardiovascular:      Rate and Rhythm: Normal rate and regular rhythm.      Heart sounds: Normal heart sounds. No murmur heard.     No friction rub.   Pulmonary:      Effort: Pulmonary effort is normal. No respiratory distress.      Breath sounds: Normal breath sounds. No stridor. No wheezing, rhonchi or rales.      Comments: Clear breath sounds BL, no respiratory distress  Abdominal:      General: Abdomen is flat.  Bowel sounds are normal. There is no distension.      Palpations: Abdomen is soft.      Tenderness: There is abdominal tenderness.      Comments: Mild epigastric tenderness without guarding or rigidity.   Musculoskeletal:         General: Normal range of motion.      Cervical back: Normal range of motion and neck supple.   Skin:     General: Skin is warm and dry.      Capillary Refill: Capillary refill takes less than 2 seconds.   Neurological:      Mental Status: She is alert and oriented to person, place, and time. Mental status is at baseline.      Coordination: Coordination normal.   Psychiatric:         Mood and Affect: Mood normal.         Behavior: Behavior normal.         Thought Content: Thought content normal.         Judgment: Judgment normal.         Results Reviewed       Procedure Component Value Units Date/Time    FLU/COVID Rapid Antigen (30 min. TAT) - Preferred screening test in ED [[id number]]  (Normal) Collected: 02/01/25 0045    Lab Status: Final result Specimen: Nares from Nose Updated: 02/01/25 0110     SARS COV Rapid Antigen Negative     Influenza A Rapid Antigen Negative     Influenza B Rapid Antigen Negative    Narrative:      This test has been performed using the Quidel Keila 2 FLU+SARS Antigen test under the Emergency Use Authorization (EUA). This test has been validated by the  and verified by the performing laboratory. The Keila uses lateral flow immunofluorescent sandwich assay to detect SARS-COV, Influenza A and Influenza B Antigen.     The Quidel Keila 2 SARS Antigen test does not differentiate between SARS-CoV and SARS-CoV-2.     Negative results are presumptive and may be confirmed with a molecular assay, if necessary, for patient management. Negative results do not rule out SARS-CoV-2 or influenza infection and should not be used as the sole basis for treatment or patient management decisions. A negative test result may occur if the level of antigen in a sample is below  the limit of detection of this test.     Positive results are indicative of the presence of viral antigens, but do not rule out bacterial infection or co-infection with other viruses.     All test results should be used as an adjunct to clinical observations and other information available to the provider.    FOR PEDIATRIC PATIENTS - copy/paste COVID Guidelines URL to browser: [email or web address]    POCT pregnancy, urine [[id number]]  (Normal) Collected: 02/01/25 0042    Lab Status: Final result Updated: 02/01/25 0046     EXT Preg Test, Ur Negative     Control Valid            No orders to display       Procedures    ED Medication and Procedure Management   Prior to Admission Medications   Prescriptions Last Dose Informant Patient Reported? Taking?   acetaminophen (TYLENOL) 650 mg CR tablet   No No   Sig: Take 1 tablet (650 mg total) by mouth every 8 (eight) hours as needed for mild pain   benzonatate (TESSALON PERLES) 100 mg capsule   No No   Sig: Take 1 capsule (100 mg total) by mouth every 8 (eight) hours   ibuprofen (MOTRIN) 600 mg tablet   No No   Sig: Take 1 tablet (600 mg total) by mouth every 6 (six) hours as needed for mild pain   ondansetron (ZOFRAN-ODT) 4 mg disintegrating tablet   No No   Sig: Take 1 tablet (4 mg total) by mouth every 8 (eight) hours as needed for nausea or vomiting for up to 20 doses   Patient not taking: Reported on 6/12/2024   ondansetron (ZOFRAN-ODT) 4 mg disintegrating tablet   No No   Sig: Take 1 tablet (4 mg total) by mouth every 8 (eight) hours as needed for nausea or vomiting for up to 20 doses      Facility-Administered Medications: None     Discharge Medication List as of 2/1/2025  1:31 AM        START taking these medications    Details   !! ondansetron (ZOFRAN-ODT) 4 mg disintegrating tablet Take 1 tablet (4 mg total) by mouth every 6 (six) hours as needed for nausea or vomiting, Starting Sat 2/1/2025, Normal       sucralfate (CARAFATE) 1 g tablet Take 1 tablet (1 g total) by mouth 4 (four) times a day, Starting Sat 2/1/2025, Normal       !! - Potential duplicate medications found. Please discuss with provider.        CONTINUE these medications which have NOT CHANGED    Details   acetaminophen (TYLENOL) 650 mg CR tablet Take 1 tablet (650 mg total) by mouth every 8 (eight) hours as needed for mild pain, Starting Wed 6/19/2024, Normal      benzonatate (TESSALON PERLES) 100 mg capsule Take 1 capsule (100 mg total) by mouth every 8 (eight) hours, Starting Thu 1/9/2025, Normal      ibuprofen (MOTRIN) 600 mg tablet Take 1 tablet (600 mg total) by mouth every 6 (six) hours as needed for mild pain, Starting Thu 6/20/2024, Normal      !! ondansetron (ZOFRAN-ODT) 4 mg disintegrating tablet Take 1 tablet (4 mg total) by mouth every 8 (eight) hours as needed for nausea or vomiting for up to 20 doses, Starting Fri 1/5/2024, Normal      !! ondansetron (ZOFRAN-ODT) 4 mg disintegrating tablet Take 1 tablet (4 mg total) by mouth every 8 (eight) hours as needed for nausea or vomiting for up to 20 doses, Starting Thu 12/5/2024, Normal       !! - Potential duplicate medications found. Please discuss with provider.        No discharge procedures on file.  ED SEPSIS DOCUMENTATION   Time reflects when diagnosis was documented in both MDM as applicable and the Disposition within this note       Time User Action Codes Description Comment    2/1/2025  1:29 AM Carson Lou [B34.9] Viral syndrome     2/1/2025  1:29 AM Carson Lou [J02.9] Pharyngitis     2/1/2025  1:29 AM Carson Lou [R10.13] Epigastric pain     2/1/2025  1:29 AM Carson Lou [K29.70] Gastritis                  Carson Lou MD  02/01/25 0142

## (undated) DEVICE — SCD SEQUENTIAL COMPRESSION COMFORT SLEEVE MEDIUM KNEE LENGTH: Brand: KENDALL SCD

## (undated) DEVICE — PREMIUM DRY TRAY LF: Brand: MEDLINE INDUSTRIES, INC.

## (undated) DEVICE — DRAPE EQUIPMENT RF WAND

## (undated) DEVICE — BETHLEHEM UNIVERSAL MINOR VAG: Brand: CARDINAL HEALTH

## (undated) DEVICE — CURETTE VACURETTE CRVD 8MM

## (undated) DEVICE — PVC URETHRAL CATHETER: Brand: DOVER

## (undated) DEVICE — D + E SAFE TOUCH TISSUE TRAP (CIRCON)

## (undated) DEVICE — D + E CONNECTION HOSE

## (undated) DEVICE — D + E SUCTION CANISTER

## (undated) DEVICE — COLLECTION SET, DISPOSABLE WITH HANDLE AND TAPERED FITTINGS TUBING, 6 FT (183 CM): Brand: GYRUS ACMI